# Patient Record
Sex: MALE | Race: WHITE | NOT HISPANIC OR LATINO | Employment: FULL TIME | URBAN - METROPOLITAN AREA
[De-identification: names, ages, dates, MRNs, and addresses within clinical notes are randomized per-mention and may not be internally consistent; named-entity substitution may affect disease eponyms.]

---

## 2017-11-14 ENCOUNTER — OFFICE VISIT (OUTPATIENT)
Dept: INTERNAL MEDICINE | Age: 52
End: 2017-11-14

## 2017-11-14 VITALS
HEIGHT: 72 IN | BODY MASS INDEX: 26.03 KG/M2 | SYSTOLIC BLOOD PRESSURE: 112 MMHG | WEIGHT: 192.2 LBS | DIASTOLIC BLOOD PRESSURE: 78 MMHG | TEMPERATURE: 97.8 F | OXYGEN SATURATION: 99 % | HEART RATE: 79 BPM

## 2017-11-14 DIAGNOSIS — M54.16 LUMBAR RADICULOPATHY, RIGHT: Primary | ICD-10-CM

## 2017-11-14 DIAGNOSIS — Z76.89 ENCOUNTER TO ESTABLISH CARE: ICD-10-CM

## 2017-11-14 PROBLEM — Z21 HIV INFECTION: Chronic | Status: ACTIVE | Noted: 2017-11-14

## 2017-11-14 PROBLEM — B20 HIV INFECTION: Chronic | Status: ACTIVE | Noted: 2017-11-14

## 2017-11-14 PROBLEM — E78.5 HYPERLIPIDEMIA: Chronic | Status: ACTIVE | Noted: 2017-11-14

## 2017-11-14 PROCEDURE — 99203 OFFICE O/P NEW LOW 30 MIN: CPT | Performed by: NURSE PRACTITIONER

## 2017-11-14 RX ORDER — ATORVASTATIN CALCIUM 40 MG/1
40 TABLET, FILM COATED ORAL EVERY OTHER DAY
COMMUNITY
Start: 2017-10-13 | End: 2021-03-11 | Stop reason: SDUPTHER

## 2017-11-14 RX ORDER — EFAVIRENZ, EMTRICITABINE, AND TENOFOVIR DISOPROXIL FUMARATE 600; 200; 300 MG/1; MG/1; MG/1
1 TABLET, FILM COATED ORAL DAILY
COMMUNITY
Start: 2017-10-13 | End: 2019-08-09

## 2017-11-14 NOTE — PROGRESS NOTES
"[facility]        Brendan Vickers / 52 y.o. / male  11/14/2017    VITALS:    /78  Pulse 79  Temp 97.8 °F (36.6 °C)  Ht 72\" (182.9 cm)  Wt 192 lb 3.2 oz (87.2 kg)  SpO2 99%  BMI 26.07 kg/m2    BP Readings from Last 3 Encounters:   11/14/17 112/78     Wt Readings from Last 3 Encounters:   11/14/17 192 lb 3.2 oz (87.2 kg)      Body mass index is 26.07 kg/(m^2).    CC: Main reason(s) for today's visit: Establish Care (new pt to establish care; pt reports back Sx 2006 to repair ruptured disc; pt states he recently has been bothered by back pain) and Back Pain      HPI:    Patient is a 52 y.o. male who is here to establish care. Previous PCP of Dr. Altman, has not been seen here in years.     Last visit with Dr. Wu (infectious disease, patient is HIV positive) was in September, she also manages labs and cholesterol medication. Patient unsure if he has had PSA level checked, but denies any previous CAIN.     Back Pain: Patient presents for presents evaluation of low back problems.  Symptoms have been present for 2 weeks and include numbness in right leg to foot and pain in lower back and right leg (aching and shooting in character; 2/10 in severity). Initial inciting event: none.  Alleviating factors identifiable by patient are none. Exacerbating factors identifiable by patient are bending forwards and sitting. Treatments so far initiated by patient: stretches, rest, swimming/water aerobics Previous lower back problems: patient had L5-S1 surgery in 2006 for ruptured disc. Previous workup: had had MRI in 2006 and 2008 reported by patient. Previous treatments: none.    History of back issues, back surgery on L5-S1 in 2006 for ruptured disc. Dr. Escobar previous for previous surgery.  Non-fasting blood sugar 112.  Patient reports today that his back pain has significantly improved in the past 24-48 hours, but still wants to establish care with a PCP. Otherwise, feels good, no weight changes, fatigue, " fever, etc. No recent infections.     Patient Care Team:  VERONIQUE Dow as PCP - General (Internal Medicine)  Amira Wu MD as Consulting Physician (Infectious Diseases)  Susan Trinidad MD as Consulting Physician (Dermatology)  ____________________________________________________________________    ASSESSMENT & PLAN:    1. Lumbar radiculopathy, right  Patient with previous history of L5-S1 surgery in 2006 for ruptured disc with recent exacerbation of LBP with radiculopathy.  He reports a significant improvement in symptoms in the past 1-2 days.  No systemic symptoms including fever, severe pain, weight loss.  Patient HIV status managed on Atripla by ID, patient reports recent normal CD4 counts.   At this time, patient reports that his pain has improved significantly, does not want to pursue any further treatment with medication or orthopedics referral.  No noted decrease in strength or sensation of RLE.  Discussed with patient that we can either pursue further treatment, such as orthopedist referral or physical therapy, or patient can continue to treat conservatively at home, which he chooses to do at this time.  I did instruct patient that if pain does not improve or gets worse, he needs to notify me and we will determine whether further imaging and/or treatment needs to be pursued at that time. He is agreeable to this plan.     I did also ask patient to sign record release to obtain his labs from September from his ID office.     We also discussed lack of concrete prostate cancer screening recommendations and risk/benefit of available screening. He has no family history of prostate Ca. At this time, he chooses to defer CAIN and PSA testing, will discuss adding PSA testing to lab work with Dr. Wu.     Return in about 1 year (around 11/14/2018) for Next scheduled follow up.    Future Appointments  Date Time Provider Department Center   11/13/2018 10:40 AM VERONIQUE Dow [facility]  None       ____________________________________________________________________        REVIEW OF SYSTEMS    Review of Systems   Constitutional: Negative for activity change, appetite change, chills, fatigue, fever and unexpected weight change.   Genitourinary: Negative for difficulty urinating, dysuria, flank pain, frequency and hematuria.         PHYSICAL EXAMINATION    Physical Exam   Constitutional: He is oriented to person, place, and time. Vital signs are normal. He appears well-developed and well-nourished. He is cooperative. He does not appear ill. No distress.   Cardiovascular: Normal rate, regular rhythm, S1 normal, S2 normal and normal heart sounds.    No murmur heard.  Pulmonary/Chest: Effort normal and breath sounds normal. He has no decreased breath sounds. He has no wheezes. He has no rhonchi. He has no rales.   Musculoskeletal:        Lumbar back: He exhibits normal range of motion, no tenderness, no bony tenderness and no pain.   Neurological: He is alert and oriented to person, place, and time. He has normal strength. No sensory deficit.   Skin: Skin is warm, dry and intact.   Psychiatric: He has a normal mood and affect. His speech is normal and behavior is normal. Judgment and thought content normal. Cognition and memory are normal.   Nursing note and vitals reviewed.      REVIEWED DATA:    Labs:   No results found for: NA, K, AST, ALT, BUN, CREATININE, EGFRIFNONA, EGFRIFAFRI    No results found for: GLU, HGBA1C, MICROALBUR    No results found for: LDL, HDL, TRIG, CHOLHDLRATIO    No results found for: TSH, FREET4     No results found for: WBC, HGB, PLT      Imaging:        Medical Tests:        Summary of old records / correspondence / consultant report:        Request outside records:        ______________________________________________________________________    ALLERGIES  No Known Allergies     MEDICATIONS  Current Outpatient Prescriptions   Medication Sig Dispense Refill   • atorvastatin  (LIPITOR) 40 MG tablet Take 40 mg by mouth Daily.     • ATRIPLA 600-200-300 MG per tablet Take 1 tablet by mouth Daily.     • Multiple Vitamins-Minerals (MULTIVITAMIN PO) Take 1 tablet by mouth Daily.       No current facility-administered medications for this visit.        PFSH:     The following portions of the patient's history were reviewed and updated as appropriate: Allergies / Current Medications / Past Medical History / Surgical History / Social History / Family History    PROBLEM LIST   Patient Active Problem List   Diagnosis   • Lumbar radiculopathy, right   • HIV infection   • Hyperlipidemia       PAST MEDICAL HISTORY  Past Medical History:   Diagnosis Date   • HIV disease    • Hyperlipidemia        SURGICAL HISTORY  Past Surgical History:   Procedure Laterality Date   • BACK SURGERY  2006       SOCIAL HISTORY  Social History     Social History   • Marital status: Single     Spouse name: N/A   • Number of children: 0   • Years of education: N/A     Occupational History   • Banker      Social History Main Topics   • Smoking status: Never Smoker   • Smokeless tobacco: Never Used   • Alcohol use No   • Drug use: No   • Sexual activity: Yes     Partners: Male     Other Topics Concern   • None     Social History Narrative   • None       FAMILY HISTORY  Family History   Problem Relation Age of Onset   • Diabetes Mother    • Heart attack Mother    • Heart disease Mother    • Colon cancer Father    • Diabetes Maternal Grandmother          **Tanvir Disclaimer:   Much of this encounter note is an electronic transcription/translation of spoken language to printed text. The electronic translation of spoken language may permit erroneous, or at times, nonsensical words or phrases to be inadvertently transcribed. Although I have reviewed the note for such errors, some may still exist.

## 2017-11-14 NOTE — PATIENT INSTRUCTIONS
Sciatica  Sciatica is pain, numbness, weakness, or tingling along the path of the sciatic nerve. The sciatic nerve starts in the lower back and runs down the back of each leg. The nerve controls the muscles in the lower leg and in the back of the knee. It also provides feeling (sensation) to the back of the thigh, the lower leg, and the sole of the foot. Sciatica is a symptom of another medical condition that pinches or puts pressure on the sciatic nerve.  Generally, sciatica only affects one side of the body. Sciatica usually goes away on its own or with treatment. In some cases, sciatica may keep coming back (recur).  CAUSES  This condition is caused by pressure on the sciatic nerve, or pinching of the sciatic nerve. This may be the result of:  · A disk in between the bones of the spine (vertebrae) bulging out too far (herniated disk).  · Age-related changes in the spinal disks (degenerative disk disease).  · A pain disorder that affects a muscle in the buttock (piriformis syndrome).  · Extra bone growth (bone spur) near the sciatic nerve.  · An injury or break (fracture) of the pelvis.  · Pregnancy.  · Tumor (rare).  RISK FACTORS  The following factors may make you more likely to develop this condition:  · Playing sports that place pressure or stress on the spine, such as football or weight lifting.  · Having poor strength and flexibility.  · A history of back injury.  · A history of back surgery.  · Sitting for long periods of time.  · Doing activities that involve repetitive bending or lifting.  · Obesity.  SYMPTOMS  Symptoms can vary from mild to very severe, and they may include:  · Any of these problems in the lower back, leg, hip, or buttock:    Mild tingling or dull aches.    Burning sensations.    Sharp pains.  · Numbness in the back of the calf or the sole of the foot.  · Leg weakness.  · Severe back pain that makes movement difficult.  These symptoms may get worse when you cough, sneeze, or laugh, or  when you sit or stand for long periods of time. Being overweight may also make symptoms worse. In some cases, symptoms may recur over time.  DIAGNOSIS  This condition may be diagnosed based on:  · Your symptoms.  · A physical exam. Your health care provider may ask you to do certain movements to check whether those movements trigger your symptoms.  · You may have tests, including:    Blood tests.    X-rays.    MRI.    CT scan.  TREATMENT  In many cases, this condition improves on its own, without any treatment. However, treatment may include:  · Reducing or modifying physical activity during periods of pain.  · Exercising and stretching to strengthen your abdomen and improve the flexibility of your spine.  · Icing and applying heat to the affected area.  · Medicines that help:    To relieve pain and swelling.    To relax your muscles.  · Injections of medicines that help to relieve pain, irritation, and inflammation around the sciatic nerve (steroids).  · Surgery.  HOME CARE INSTRUCTIONS  Medicines  · Take over-the-counter and prescription medicines only as told by your health care provider.  · Do not drive or operate heavy machinery while taking prescription pain medicine.  Managing Pain  · If directed, apply ice to the affected area.    Put ice in a plastic bag.    Place a towel between your skin and the bag.    Leave the ice on for 20 minutes, 2-3 times a day.  · After icing, apply heat to the affected area before you exercise or as often as told by your health care provider. Use the heat source that your health care provider recommends, such as a moist heat pack or a heating pad.    Place a towel between your skin and the heat source.    Leave the heat on for 20-30 minutes.    Remove the heat if your skin turns bright red. This is especially important if you are unable to feel pain, heat, or cold. You may have a greater risk of getting burned.  Activity  · Return to your normal activities as told by your health  care provider. Ask your health care provider what activities are safe for you.    Avoid activities that make your symptoms worse.  · Take brief periods of rest throughout the day. Resting in a lying or standing position is usually better than sitting to rest.    When you rest for longer periods, mix in some mild activity or stretching between periods of rest. This will help to prevent stiffness and pain.    Avoid sitting for long periods of time without moving. Get up and move around at least one time each hour.  · Exercise and stretch regularly, as told by your health care provider.  · Do not lift anything that is heavier than 10 lb (4.5 kg) while you have symptoms of sciatica. When you do not have symptoms, you should still avoid heavy lifting, especially repetitive heavy lifting.  · When you lift objects, always use proper lifting technique, which includes:    Bending your knees.    Keeping the load close to your body.    Avoiding twisting.  General Instructions   · Use good posture.    Avoid leaning forward while sitting.    Avoid hunching over while standing.  · Maintain a healthy weight. Excess weight puts extra stress on your back and makes it difficult to maintain good posture.  · Wear supportive, comfortable shoes. Avoid wearing high heels.  · Avoid sleeping on a mattress that is too soft or too hard. A mattress that is firm enough to support your back when you sleep may help to reduce your pain.  · Keep all follow-up visits as told by your health care provider. This is important.  SEEK MEDICAL CARE IF:  · You have pain that wakes you up when you are sleeping.  · You have pain that gets worse when you lie down.  · Your pain is worse than you have experienced in the past.  · Your pain lasts longer than 4 weeks.  · You experience unexplained weight loss.  SEEK IMMEDIATE MEDICAL CARE IF:  · You lose control of your bowel or bladder (incontinence).  · You have:    Weakness in your lower back, pelvis, buttocks,  or legs that gets worse.    Redness or swelling of your back.    A burning sensation when you urinate.     This information is not intended to replace advice given to you by your health care provider. Make sure you discuss any questions you have with your health care provider.     Document Released: 12/12/2002 Document Revised: 04/10/2017 Document Reviewed: 08/26/2016  [employer] Patient Education ©2017 [employer]

## 2018-06-04 ENCOUNTER — OUTSIDE FACILITY SERVICE (OUTPATIENT)
Dept: GASTROENTEROLOGY | Facility: CLINIC | Age: 53
End: 2018-06-04

## 2018-06-04 ENCOUNTER — LAB REQUISITION (OUTPATIENT)
Dept: LAB | Facility: HOSPITAL | Age: 53
End: 2018-06-04

## 2018-06-04 DIAGNOSIS — Z86.010 HISTORY OF COLONIC POLYPS: ICD-10-CM

## 2018-06-04 PROCEDURE — 88305 TISSUE EXAM BY PATHOLOGIST: CPT | Performed by: INTERNAL MEDICINE

## 2018-06-04 PROCEDURE — 45380 COLONOSCOPY AND BIOPSY: CPT | Performed by: INTERNAL MEDICINE

## 2018-06-06 LAB
CYTO UR: NORMAL
LAB AP CASE REPORT: NORMAL
LAB AP CLINICAL INFORMATION: NORMAL
Lab: NORMAL
PATH REPORT.FINAL DX SPEC: NORMAL
PATH REPORT.GROSS SPEC: NORMAL

## 2018-06-11 PROBLEM — D12.3 ADENOMATOUS POLYP OF TRANSVERSE COLON: Status: ACTIVE | Noted: 2018-06-11

## 2018-10-26 ENCOUNTER — TELEPHONE (OUTPATIENT)
Dept: INTERNAL MEDICINE | Age: 53
End: 2018-10-26

## 2018-10-26 DIAGNOSIS — E78.5 HYPERLIPIDEMIA, UNSPECIFIED HYPERLIPIDEMIA TYPE: ICD-10-CM

## 2018-10-26 DIAGNOSIS — Z00.00 ROUTINE HEALTH MAINTENANCE: Primary | ICD-10-CM

## 2018-10-26 NOTE — TELEPHONE ENCOUNTER
Lab orders are placed.       ----- Message from Lala Muñiz LPN sent at 10/26/2018  1:18 PM EDT -----  Regarding: Physical Labs  Pt is coming in on Monday for CPE labs. Is there a specific set you want ordered? I'm not sure what to put in.    KIMBERLYN Ochoa

## 2018-10-30 LAB
ALBUMIN SERPL-MCNC: 4.4 G/DL (ref 3.5–5.2)
ALBUMIN/GLOB SERPL: 1.5 G/DL
ALP SERPL-CCNC: 77 U/L (ref 39–117)
ALT SERPL-CCNC: 46 U/L (ref 1–41)
APPEARANCE UR: ABNORMAL
AST SERPL-CCNC: 38 U/L (ref 1–40)
BACTERIA #/AREA URNS HPF: ABNORMAL /HPF
BASOPHILS # BLD AUTO: 0.01 10*3/MM3 (ref 0–0.2)
BASOPHILS NFR BLD AUTO: 0.2 % (ref 0–1.5)
BILIRUB SERPL-MCNC: 0.4 MG/DL (ref 0.1–1.2)
BILIRUB UR QL STRIP: NEGATIVE
BUN SERPL-MCNC: 14 MG/DL (ref 6–20)
BUN/CREAT SERPL: 10.5 (ref 7–25)
CALCIUM SERPL-MCNC: 9.4 MG/DL (ref 8.6–10.5)
CHLORIDE SERPL-SCNC: 101 MMOL/L (ref 98–107)
CHOLEST SERPL-MCNC: 78 MG/DL (ref 0–200)
CHOLEST/HDLC SERPL: 2.6 {RATIO}
CO2 SERPL-SCNC: 26.1 MMOL/L (ref 22–29)
COLOR UR: YELLOW
CREAT SERPL-MCNC: 1.33 MG/DL (ref 0.76–1.27)
CRYSTALS URNS MICRO: ABNORMAL
EOSINOPHIL # BLD AUTO: 0.1 10*3/MM3 (ref 0–0.7)
EOSINOPHIL NFR BLD AUTO: 1.6 % (ref 0.3–6.2)
EPI CELLS #/AREA URNS HPF: ABNORMAL /HPF
ERYTHROCYTE [DISTWIDTH] IN BLOOD BY AUTOMATED COUNT: 13.7 % (ref 11.5–14.5)
GLOBULIN SER CALC-MCNC: 3 GM/DL
GLUCOSE SERPL-MCNC: 88 MG/DL (ref 65–99)
GLUCOSE UR QL: NEGATIVE
HCT VFR BLD AUTO: 48.1 % (ref 40.4–52.2)
HDLC SERPL-MCNC: 30 MG/DL (ref 40–60)
HGB BLD-MCNC: 16.5 G/DL (ref 13.7–17.6)
HGB UR QL STRIP: NEGATIVE
IMM GRANULOCYTES # BLD: 0.01 10*3/MM3 (ref 0–0.03)
IMM GRANULOCYTES NFR BLD: 0.2 % (ref 0–0.5)
KETONES UR QL STRIP: NEGATIVE
LDLC SERPL CALC-MCNC: 14 MG/DL (ref 0–100)
LEUKOCYTE ESTERASE UR QL STRIP: NEGATIVE
LYMPHOCYTES # BLD AUTO: 2.88 10*3/MM3 (ref 0.9–4.8)
LYMPHOCYTES NFR BLD AUTO: 45.9 % (ref 19.6–45.3)
MCH RBC QN AUTO: 32.1 PG (ref 27–32.7)
MCHC RBC AUTO-ENTMCNC: 34.3 G/DL (ref 32.6–36.4)
MCV RBC AUTO: 93.6 FL (ref 79.8–96.2)
MICRO URNS: ABNORMAL
MICRO URNS: ABNORMAL
MONOCYTES # BLD AUTO: 0.53 10*3/MM3 (ref 0.2–1.2)
MONOCYTES NFR BLD AUTO: 8.5 % (ref 5–12)
MUCOUS THREADS URNS QL MICRO: PRESENT /HPF
NEUTROPHILS # BLD AUTO: 2.75 10*3/MM3 (ref 1.9–8.1)
NEUTROPHILS NFR BLD AUTO: 43.8 % (ref 42.7–76)
NITRITE UR QL STRIP: NEGATIVE
PH UR STRIP: 7 [PH] (ref 5–7.5)
PLATELET # BLD AUTO: 212 10*3/MM3 (ref 140–500)
POTASSIUM SERPL-SCNC: 4.2 MMOL/L (ref 3.5–5.2)
PROT SERPL-MCNC: 7.4 G/DL (ref 6–8.5)
PROT UR QL STRIP: NEGATIVE
RBC # BLD AUTO: 5.14 10*6/MM3 (ref 4.6–6)
RBC #/AREA URNS HPF: ABNORMAL /HPF
SODIUM SERPL-SCNC: 140 MMOL/L (ref 136–145)
SP GR UR: 1.02 (ref 1–1.03)
TRIGL SERPL-MCNC: 168 MG/DL (ref 0–150)
TSH SERPL DL<=0.005 MIU/L-ACNC: 2.99 MIU/ML (ref 0.27–4.2)
UNIDENT CRYS URNS QL MICRO: PRESENT /LPF
URINALYSIS REFLEX: ABNORMAL
UROBILINOGEN UR STRIP-MCNC: 0.2 MG/DL (ref 0.2–1)
VLDLC SERPL CALC-MCNC: 33.6 MG/DL (ref 5–40)
WBC # BLD AUTO: 6.27 10*3/MM3 (ref 4.5–10.7)
WBC #/AREA URNS HPF: ABNORMAL /HPF

## 2018-11-05 ENCOUNTER — OFFICE VISIT (OUTPATIENT)
Dept: INTERNAL MEDICINE | Age: 53
End: 2018-11-05

## 2018-11-05 ENCOUNTER — HOSPITAL ENCOUNTER (OUTPATIENT)
Dept: GENERAL RADIOLOGY | Facility: HOSPITAL | Age: 53
Discharge: HOME | End: 2018-11-05
Admitting: NURSE PRACTITIONER

## 2018-11-05 VITALS
WEIGHT: 188.8 LBS | HEART RATE: 91 BPM | HEIGHT: 72 IN | DIASTOLIC BLOOD PRESSURE: 88 MMHG | BODY MASS INDEX: 25.57 KG/M2 | OXYGEN SATURATION: 96 % | TEMPERATURE: 98.3 F | SYSTOLIC BLOOD PRESSURE: 126 MMHG

## 2018-11-05 DIAGNOSIS — Z00.00 ANNUAL PHYSICAL EXAM: Primary | ICD-10-CM

## 2018-11-05 DIAGNOSIS — M54.16 LUMBAR RADICULOPATHY, RIGHT: ICD-10-CM

## 2018-11-05 DIAGNOSIS — Z12.5 ENCOUNTER FOR PROSTATE CANCER SCREENING: ICD-10-CM

## 2018-11-05 DIAGNOSIS — D84.9 IMMUNOCOMPROMISED (HCC): ICD-10-CM

## 2018-11-05 DIAGNOSIS — Z23 ENCOUNTER FOR IMMUNIZATION: ICD-10-CM

## 2018-11-05 LAB — PSA SERPL-MCNC: 1.5 NG/ML (ref 0–4)

## 2018-11-05 PROCEDURE — 99396 PREV VISIT EST AGE 40-64: CPT | Performed by: NURSE PRACTITIONER

## 2018-11-05 PROCEDURE — 90471 IMMUNIZATION ADMIN: CPT | Performed by: NURSE PRACTITIONER

## 2018-11-05 PROCEDURE — 90715 TDAP VACCINE 7 YRS/> IM: CPT | Performed by: NURSE PRACTITIONER

## 2018-11-05 PROCEDURE — 72110 X-RAY EXAM L-2 SPINE 4/>VWS: CPT

## 2018-11-05 PROCEDURE — 99214 OFFICE O/P EST MOD 30 MIN: CPT | Performed by: NURSE PRACTITIONER

## 2018-11-05 RX ORDER — FLUTICASONE PROPIONATE 50 MCG
SPRAY, SUSPENSION (ML) NASAL
Refills: 5 | COMMUNITY
Start: 2018-10-05 | End: 2019-11-04

## 2018-11-05 NOTE — PROGRESS NOTES
[facility]      Brendan Vickers / 53 y.o. / male  11/05/2018    CC: Annual Exam (CPE )      HPI:      Brendan presents for annual health maintenance visit. Appointment with Dr. Wu.     Back Pain: Patient presents for presents evaluation of low back problems.  Symptoms have been present for 2 years and include paresthesias in right foot. . Initial inciting event: none. . Exacerbating factors identifiable by patient are sitting. Treatments so far initiated by patient: none Previous lower back problems: Patient has history of discectomy 2006, reports significant flare up in 2010. Recent back pain has been going on for about 2 years now, occurs about once weekly, no worsening of symptoms since restarting 2 years ago. . Previous workup: last MRI 2010 . Denies any fever, chills, weight loss. No weakness of leg or bowel or bladder incontinence.       · Last health maintenance visit: 1 year ago  · General health: good  · Lifestyle:  · Attempting to lose weight?: No   · Diet: good  · Exercise: good, water aerobics and swimming laps   · Tobacco: Never used   · Alcohol:   · Work: Full-time    · Reproductive health:  · Sexually active?: Yes   · Concern for STD?: No   · Sexual problems?: No problems   · Sees Urologist?: No   · Depression Screening:      PHQ-2/PHQ-9 Depression Screening 11/5/2018   Little interest or pleasure in doing things 0   Feeling down, depressed, or hopeless 0   Total Score 0         PHQ-2: 0 (Not depressed)     PHQ-9: 0 (Negative screening for depression)    Patient Care Team:  Mary Ann Chapman APRN as PCP - General (Internal Medicine)  Amira Wu MD as Consulting Physician (Infectious Diseases)  Susan Trinidad MD as Consulting Physician (Dermatology)  MonikChristophe diane MD as Consulting Physician (Gastroenterology)  ______________________________________________________________________    ALLERGIES  No Known Allergies     MEDICATIONS  Current Outpatient Prescriptions    Medication Sig Dispense Refill   • atorvastatin (LIPITOR) 40 MG tablet Take 40 mg by mouth Daily.     • ATRIPLA 600-200-300 MG per tablet Take 1 tablet by mouth Daily.     • fluticasone (FLONASE) 50 MCG/ACT nasal spray SPRAY 2 SPRAYS INTO EACH NOSTRIL EVERY DAY  5   • Multiple Vitamins-Minerals (MULTIVITAMIN PO) Take 1 tablet by mouth Daily.       No current facility-administered medications for this visit.        PFSH:     The following portions of the patient's history were reviewed and updated as appropriate: Allergies / Current Medications / Past Medical History / Surgical History / Social History / Family History    PROBLEM LIST   Patient Active Problem List   Diagnosis   • Lumbar radiculopathy, right   • HIV infection (CMS/HCC)   • Hyperlipidemia   • Adenomatous polyp of transverse colon       PAST MEDICAL HISTORY  Past Medical History:   Diagnosis Date   • Colon polyp    • HIV disease (CMS/HCC)    • Hyperlipidemia        SURGICAL HISTORY  Past Surgical History:   Procedure Laterality Date   • BACK SURGERY  2006       SOCIAL HISTORY  Social History     Social History   • Marital status: Single   • Number of children: 0     Occupational History   • Banker      Social History Main Topics   • Smoking status: Never Smoker   • Smokeless tobacco: Never Used   • Alcohol use No   • Drug use: No   • Sexual activity: Yes     Partners: Male     Other Topics Concern   • Not on file       FAMILY HISTORY  Family History   Problem Relation Age of Onset   • Diabetes Mother    • Heart attack Mother    • Heart disease Mother    • Colon cancer Father    • Diabetes Maternal Grandmother        IMMUNIZATION HISTORY  Immunization History   Administered Date(s) Administered   • Flu Mist 10/11/2017   • Flu Vaccine Quad PF >36MO 10/08/2018   • Hepatitis A 04/20/2018, 10/20/2018   • Pneumococcal Conjugate 13-Valent (PCV13) 11/14/2015   • Pneumococcal Polysaccharide (PPSV23) 11/01/2016   • Tdap 11/05/2018  "      ______________________________________________________________________    REVIEW OF SYSTEMS    Review of Systems   Constitutional: Negative for activity change, appetite change, fatigue, fever and unexpected weight change.   HENT: Negative for congestion, ear pain, hearing loss, sore throat and trouble swallowing.    Eyes: Negative for pain and visual disturbance.   Respiratory: Negative for cough and shortness of breath.    Cardiovascular: Negative for chest pain and leg swelling.   Gastrointestinal: Negative for abdominal pain, blood in stool, constipation, diarrhea, nausea and vomiting.   Endocrine: Negative for polydipsia, polyphagia and polyuria.   Genitourinary: Negative for decreased urine volume, difficulty urinating, discharge, dysuria, enuresis, frequency, hematuria, penile pain, penile swelling, scrotal swelling, testicular pain and urgency.   Musculoskeletal: Positive for back pain. Negative for gait problem.   Neurological: Negative for dizziness, syncope, speech difficulty, weakness, numbness and headaches.   Psychiatric/Behavioral: Negative for confusion and sleep disturbance. The patient is not nervous/anxious.          VITALS:    Visit Vitals  /88   Pulse 91   Temp 98.3 °F (36.8 °C)   Ht 182.9 cm (72.01\")   Wt 85.6 kg (188 lb 12.8 oz)   SpO2 96%   BMI 25.60 kg/m²       BP Readings from Last 3 Encounters:   11/05/18 126/88   11/14/17 112/78     Wt Readings from Last 3 Encounters:   11/05/18 85.6 kg (188 lb 12.8 oz)   11/14/17 87.2 kg (192 lb 3.2 oz)      Body mass index is 25.6 kg/m².    PHYSICAL EXAMINATION    Physical Exam   Constitutional: Vital signs are normal. He appears well-developed and well-nourished. He is cooperative. He does not appear ill. No distress.   HENT:   Head: Normocephalic and atraumatic.   Right Ear: Hearing, tympanic membrane, external ear and ear canal normal.   Left Ear: Hearing, tympanic membrane, external ear and ear canal normal.   Nose: Nose normal. "   Mouth/Throat: Uvula is midline and oropharynx is clear and moist.   Eyes: Pupils are equal, round, and reactive to light. EOM and lids are normal.   Neck: Trachea normal and full passive range of motion without pain. Carotid bruit is not present. No thyroid mass and no thyromegaly present.   Cardiovascular: Normal rate, regular rhythm, S1 normal, S2 normal and normal heart sounds.    No murmur heard.  Pulmonary/Chest: Effort normal and breath sounds normal.   Abdominal: Soft. Normal appearance and bowel sounds are normal. There is no tenderness.   Genitourinary:   Genitourinary Comments: Deferred    Lymphadenopathy:     He has no cervical adenopathy.     He has no axillary adenopathy.   Neurological: He is alert. He has normal strength. He is not disoriented. No cranial nerve deficit or sensory deficit.   Reflex Scores:       Bicep reflexes are 2+ on the right side and 2+ on the left side.       Brachioradialis reflexes are 2+ on the right side and 2+ on the left side.       Patellar reflexes are 1+ on the right side and 1+ on the left side.       Achilles reflexes are 1+ on the right side and 1+ on the left side.  Skin: Skin is warm, dry and intact.   Nursing note and vitals reviewed.        REVIEWED DATA    Labs:    Lab Results   Component Value Date     10/29/2018    K 4.2 10/29/2018    AST 38 10/29/2018    ALT 46 (H) 10/29/2018    BUN 14 10/29/2018    CREATININE 1.33 (H) 10/29/2018    EGFRIFNONA 56 (L) 10/29/2018    EGFRIFAFRI 68 10/29/2018       Lab Results   Component Value Date    TSH 2.99 10/29/2018       No results found for: PSA, TESTOSTEROTT    Lab Results   Component Value Date    LDL 14 10/29/2018    HDL 30 (L) 10/29/2018    TRIG 168 (H) 10/29/2018    CHOLHDLRATIO 2.60 10/29/2018       No components found for: [id number]    Lab Results   Component Value Date    HGB 16.5 10/29/2018    MCV 93.6 10/29/2018     10/29/2018       Lab Results   Component Value Date    PROTEIN Negative 10/29/2018     GLUCOSEU Negative 10/29/2018    BLOODU Negative 10/29/2018    NITRITEU Negative 10/29/2018    LEUKOCYTESUR Negative 10/29/2018        No results found for: HEPCVIRUSABY    Imaging:         Medical Tests:       ______________________________________________________________________    ASSESSMENT & PLAN    ANNUAL WELLNESS EXAM / PHYSICAL     Other medical problems addressed today:    Lumbar radiculopathy       Summary/Discussion:       1. Annual physical exam      2. Encounter for immunization    - Tdap Vaccine Greater Than or Equal To 8yo IM    3. Lumbar radiculopathy, right  Patient with c/o chronic right back pain with radiculopathy, s/p surgery 2006, with increased symptoms (not worsening) over the past 2 years. Considering patient's HIV history, will obtain XR today to r/o acute bone abnormality. Discussed with patient may consider referral to spine specialist/ortho pending results of XR if he so chooses.     - XR Spine Lumbar 4+ View    4. Immunocompromised (CMS/HCC)    - XR Spine Lumbar 4+ View    5. Encounter for prostate cancer screening  Discussed risk vs. Benefit of CAIN, no standard guidelines for performing annual CAIN. Patient defers at this time, will check PSA level today.     - PSA SCREENING        Return in about 1 year (around 11/5/2019) for Annual physical, Next scheduled follow up.    Future Appointments  Date Time Provider Department Center   10/30/2019 8:30 AM [facility] [facility] None   11/7/2019 8:00 AM Mary Ann Chapman APRN [facility] None         HEALTHCARE MAINTENANCE ISSUES:    Cancer Screening:  · Colon: Initial/Next screening at age: CURRENT  · Repeat colonoscopy every 5 years  · Prostate: PSA checked annually but no CAIN needed currently  · Testicular: Recommended monthly self exam  · Skin: Monthly self skin examination, annual exam by health professional  · Lung:   · Other:    Screening Labs & Tests:  · Lab results reviewed & discussed with with patient or orders placed  today.  · EKG:  · Vascular Screening:   · DEXA (75+ or risk factors):   · HEP C (If born [id number] or risk factors): Previously had negative screen    Immunization/Vaccinations (to be given today unless deferred by patient)  · Influenza: Patient had the flu shot this season  · Hepatitis A: Up to date  · Tetanus/Pertussis: Administer today  · Pneumovax: Up to date  · Prevnar 13: Up to date  · Shingles: Recommended Shingrix at pharmacy  · Other:     Lifestyle Counseling:  · Lifestyle Modifications:   · Safety Issues: Always wear seatbelt, Avoid texting while driving   · Use sunscreen, regular skin examination  · Recommended annual dental/vision examination.  · Emotional/Stress/Sleep: Reviewed and  given when appropriate      Health Maintenance   Topic Date Due   • TDAP/TD VACCINES (1 - Tdap) 08/30/1982   • ZOSTER VACCINE (1 of 2) 08/30/2013   • LIPID PANEL  10/29/2019   • ANNUAL PHYSICAL  11/06/2019   • PNEUMOCOCCAL VACCINE (19-64 HIGHEST RISK) (3 of 3 - PPSV23) 11/01/2021   • COLONOSCOPY  06/04/2023   • PNEUMOCOCCAL VACCINE (19-64 HIGH RISK)  Completed   • HEPATITIS C SCREENING  Completed   • INFLUENZA VACCINE  Completed         **Dragon Disclaimer:   Much of this encounter note is an electronic transcription/translation of spoken language to printed text. The electronic translation of spoken language may permit erroneous, or at times, nonsensical words or phrases to be inadvertently transcribed. Although I have reviewed the note for such errors, some may still exist.

## 2018-11-07 DIAGNOSIS — M54.16 LUMBAR RADICULOPATHY, RIGHT: Primary | ICD-10-CM

## 2018-11-23 ENCOUNTER — HOSPITAL ENCOUNTER (OUTPATIENT)
Dept: MRI IMAGING | Facility: HOSPITAL | Age: 53
Discharge: HOME | End: 2018-11-23
Admitting: NURSE PRACTITIONER

## 2018-11-23 PROCEDURE — 72148 MRI LUMBAR SPINE W/O DYE: CPT

## 2018-11-30 ENCOUNTER — TELEPHONE (OUTPATIENT)
Dept: INTERNAL MEDICINE | Age: 53
End: 2018-11-30

## 2018-11-30 DIAGNOSIS — Z98.890 HISTORY OF LUMBAR LAMINECTOMY: ICD-10-CM

## 2018-11-30 DIAGNOSIS — M51.36 DDD (DEGENERATIVE DISC DISEASE), LUMBAR: ICD-10-CM

## 2018-11-30 DIAGNOSIS — M48.04 THORACIC STENOSIS: Primary | ICD-10-CM

## 2018-11-30 NOTE — TELEPHONE ENCOUNTER
S/w pt and discussed options of either PT, or Ortho referral.     Pt would like to take the Ortho route at this time.     When pt asked about who we usually send referrals to, I mentioned [facility], and pt stated he has been seen by them before, when the pain originally started. Pt would like to return there if at all possible, and preferably sooner rather than later.     Please advise re: referral to Ortho.    KD

## 2018-11-30 NOTE — TELEPHONE ENCOUNTER
Patient called and would like to talk with you about his MRI spine. He would like to talk with you about this, as he has some questions.

## 2019-01-08 ENCOUNTER — OFFICE VISIT (OUTPATIENT)
Dept: ORTHOPEDIC SURGERY | Facility: CLINIC | Age: 54
End: 2019-01-08

## 2019-01-08 VITALS — TEMPERATURE: 98.1 F | BODY MASS INDEX: 25.06 KG/M2 | HEIGHT: 72 IN | WEIGHT: 185 LBS

## 2019-01-08 DIAGNOSIS — M54.31 SCIATICA OF RIGHT SIDE: Primary | ICD-10-CM

## 2019-01-08 PROCEDURE — 99203 OFFICE O/P NEW LOW 30 MIN: CPT | Performed by: ORTHOPAEDIC SURGERY

## 2019-01-08 NOTE — PROGRESS NOTES
New patient or new problem visit    Chief Complaint   Patient presents with   • Thoracic Spine - Pain       HPI: He has a long-standing history of thoracic back pain which radiates the right lower extremity no real lumbar pain in the thoracic pain is not bad is mostly what he calls sciatic.  Had a similar episode no 6 after which she he underwent lumbar discectomy by Dr. Celestin with excellent relief.  He does note occasional tingling in the right foot.  No balance difficulties bowel or bladder complaints.  He does aqua therapy and some swimming.    PFSH: See chart- reviewed    Review of Systems   Constitutional: Negative for chills, fever and unexpected weight change.   HENT: Negative for trouble swallowing and voice change.    Eyes: Negative for visual disturbance.   Respiratory: Negative for cough and shortness of breath.    Cardiovascular: Negative for chest pain and leg swelling.   Gastrointestinal: Negative for abdominal pain, nausea and vomiting.   Endocrine: Negative for cold intolerance and heat intolerance.   Genitourinary: Negative for difficulty urinating, frequency and urgency.   Skin: Negative for rash and wound.   Allergic/Immunologic: Negative for immunocompromised state.   Neurological: Negative for weakness and numbness.   Hematological: Does not bruise/bleed easily.   Psychiatric/Behavioral: Negative for dysphoric mood. The patient is not nervous/anxious.        PE: Constitutional: Vital signs above-noted.  Awake, alert and oriented    Psychiatric: Affect and insight do not appear grossly disturbed.    Pulmonary: Breathing is unlabored, color is good.    Skin: Warm, dry and normal turgor    Cardiac:  pedal pulses intact.  No edema.    Eyesight and hearing appear adequate for examination purposes      Musculoskeletal:  There is no tenderness to percussion and palpation of the spine. Motion appears undisturbed.  Posture is unremarkable to coronal and sagittal inspection.    The skin about the area  is intact.  There is no palpable or visible deformity.  There is no local spasm.       Neurologic:   Reflexes are 2+ and symmetrical in the patellae and left Achilles but absent on the right.   Motor function is undisturbed in quadriceps, EHL, and gastrocnemius      Sensation appears symmetrically intact to light touch   .  In the bilateral lower extremities there is no evidence of atrophy.   Clonus is absent..  Gait appears undisturbed.  SLR test is equivocal      MEDICAL DECISION MAKING    XRAY: Plain film x-rays of the lumbar spine show lumbosacral disc space degeneration and loss of lordosis no comparison views are available.  MRI scan of the lumbar spine demonstrates minimal disc bulging and some conjoint nerves at L5 S1 may be a hint of the foraminal stenosis on the right but nothing terrible.  Remainder the disc spaces appear fairly well-preserved there is a small disc bulge at the edge of the image at T10 11 which is not detail.  I reviewed the radiologist report.    Other: n/a    Impression: Back pain and the lumbar radiculopathy    Plan: Now physical therapy and when necessary follow-up.  If he fails to improve epidurals could be helpful as an empiric determination of etiology, as well as a source of pain relief.

## 2019-08-08 NOTE — PROGRESS NOTES
"Brendan Vickers / 53 y.o. / male  Encounter Date: 08/09/2019    ASSESSMENT & PLAN:    Problem List Items Addressed This Visit     None      Visit Diagnoses     Localized swelling, mass or lump of neck    -  Primary    Relevant Orders    US Head Neck Soft Tissue    Common wart        Relevant Medications    BIKTARVY -25 MG per tablet    Other Relevant Orders    Cryotherapy, Skin Lesion        Orders Placed This Encounter   Procedures   • Cryotherapy, Skin Lesion   • US Head Neck Soft Tissue     No orders of the defined types were placed in this encounter.      Summary/Discussion:  1.  Ultrasound ordered for nonspecific localized lump of posterior left neck x3 weeks. Monitor for signs of fever, chills, impaired breathing/swallowing, pain, decreased neck or shoulder ROM.   2.  Common wart on posterior left foot, cryotherapy applied today.  Advised patient to keep the area covered and clean, skin will peel, and to follow-up in 2 weeks for repeat cryotherapy.    Cryotherapy, Skin Lesion  Date/Time: 8/9/2019 1:50 PM  Performed by: Linnette Frey APRN  Authorized by: Linnette Frey APRN   Consent: Verbal consent obtained.  Risks and benefits: risks, benefits and alternatives were discussed  Consent given by: patient  Patient understanding: patient states understanding of the procedure being performed  Patient identity confirmed: verbally with patient  Patient tolerance: Patient tolerated the procedure well with no immediate complications          Return in about 2 weeks (around 8/23/2019), or if symptoms worsen or fail to improve, for wart cryotherapy.  ________________________________________________________________    VITALS:    Visit Vitals  /80   Pulse 82   Temp 97.1 °F (36.2 °C) (Temporal)   Ht 182.9 cm (72.01\")   Wt 87.5 kg (193 lb)   SpO2 97%   BMI 26.17 kg/m²       BP Readings from Last 3 Encounters:   08/09/19 122/80   11/05/18 126/88   11/14/17 112/78     Wt Readings from Last 3 Encounters:   08/09/19 87.5 " kg (193 lb)   01/08/19 83.9 kg (185 lb)   11/23/18 85.3 kg (188 lb)      Body mass index is 26.17 kg/m².    CC: Main reason(s) for today's visit: Mass (back of lower left side of neck.)      HPI    Patient is a 53 y.o. male who is here for complaint of a lump on the back of his neck approximately 3 weeks.  He is a new patient to me. These are new problems to me. I am providing cross coverage for her PCP, Mary Ann Chapman while she is on leave.   Lump is non-tender, non-erythematous, immobile, semi-soft. There are no lesions or scabs or openings on the lump area. It is located at the posterior L base of the neck. Approximate size [id number]. He does have HIV infection, and is managed by outside provider, reports his cell counts are well within normal range. He denies pain, impaired ROM, headaches, swallowing/breathing impairment, numbness/tingling, weight loss, night sweats, SOA, vision changes.     Of note, he also has a lump area from childhood on the L medial quad, that has not changed since he was a child. Workup for this was negative.     Common wart located posterior L heel. Has tried OTC treatments x 2 without successful resolution.     Patient Care Team:  Mary Ann Chapman APRN as PCP - General (Internal Medicine)  Amira Wu MD as Consulting Physician (Infectious Diseases)  Susan Trinidad MD as Consulting Physician (Dermatology)  [facility], Christophe Clifton MD as Consulting Physician (Gastroenterology)  ____________________________________________________________________    REVIEW OF SYSTEMS    Review of Systems   Constitutional: Negative.  Negative for activity change, appetite change, chills, diaphoresis, fatigue, fever and unexpected weight change.   HENT: Negative for sore throat and trouble swallowing.    Eyes: Negative for photophobia and visual disturbance.   Respiratory: Negative.  Negative for cough and shortness of breath.    Cardiovascular: Negative.    Musculoskeletal: Negative.   Negative for arthralgias, myalgias, neck pain and neck stiffness.   Skin:        Wart on L heel, superficial lump on posterior L neck.   Neurological: Negative.  Negative for dizziness, weakness, light-headedness and headaches.   Hematological: Negative for adenopathy.   Psychiatric/Behavioral: Negative.        PHYSICAL EXAMINATION    Physical Exam   Constitutional: He is oriented to person, place, and time. He appears well-developed and well-nourished. No distress.   Neck: Normal range of motion. Neck supple.   Pulmonary/Chest: Effort normal.   Musculoskeletal: Normal range of motion. He exhibits no edema or tenderness.   Lymphadenopathy:     He has no cervical adenopathy.   Neurological: He is alert and oriented to person, place, and time.   Skin: Skin is warm and dry.   See HPI: superficial lump on L posterior neck, Common wart on L heel   Psychiatric: He has a normal mood and affect. His behavior is normal. Judgment and thought content normal.   Vitals reviewed.    REVIEWED DATA:    Labs:   Lab Results   Component Value Date     10/29/2018    K 4.2 10/29/2018    AST 38 10/29/2018    ALT 46 (H) 10/29/2018    BUN 14 10/29/2018    CREATININE 1.33 (H) 10/29/2018    EGFRIFNONA 56 (L) 10/29/2018    EGFRIFAFRI 68 10/29/2018       No results found for: GLUCOSE, HGBA1C, MICROALBUR    Lab Results   Component Value Date    LDL 14 10/29/2018    HDL 30 (L) 10/29/2018    TRIG 168 (H) 10/29/2018    CHOLHDLRATIO 2.60 10/29/2018       Lab Results   Component Value Date    TSH 2.99 10/29/2018          Lab Results   Component Value Date    WBC 6.27 10/29/2018    HGB 16.5 10/29/2018     10/29/2018         Imaging:      Medical Tests:      Summary of old records / correspondence / consultant report:      Request outside records:   ______________________________________________________________________    ALLERGIES  No Known Allergies     MEDICATIONS  Current Outpatient Medications on File Prior to Visit   Medication Sig    • atorvastatin (LIPITOR) 40 MG tablet Take 40 mg by mouth Daily.   • fluticasone (FLONASE) 50 MCG/ACT nasal spray SPRAY 2 SPRAYS INTO EACH NOSTRIL EVERY DAY   • Multiple Vitamins-Minerals (MULTIVITAMIN PO) Take 1 tablet by mouth Daily.   • Zoster Vac Recomb Adjuvanted (SHINGRIX) 50 MCG/0.5ML reconstituted suspension ADMINISTER AS DIRECTED   • BIKTARVY -25 MG per tablet    • [DISCONTINUED] ATRIPLA 600-200-300 MG per tablet Take 1 tablet by mouth Daily.     No current facility-administered medications on file prior to visit.        PFSH:     The following portions of the patient's history were reviewed and updated as appropriate: Allergies / Current Medications / Past Medical History / Surgical History / Social History / Family History    PROBLEM LIST   Patient Active Problem List   Diagnosis   • Lumbar radiculopathy, right   • HIV infection (CMS/HCC)   • Hyperlipidemia   • Adenomatous polyp of transverse colon       PAST MEDICAL HISTORY  Past Medical History:   Diagnosis Date   • Colon polyp    • HIV disease (CMS/HCC)    • Hyperlipidemia        SURGICAL HISTORY  Past Surgical History:   Procedure Laterality Date   • BACK SURGERY  2006       SOCIAL HISTORY  Social History     Socioeconomic History   • Marital status: Single     Spouse name: Not on file   • Number of children: 0   • Years of education: Not on file   • Highest education level: Not on file   Occupational History   • Occupation: Banker   Tobacco Use   • Smoking status: Never Smoker   • Smokeless tobacco: Never Used   Substance and Sexual Activity   • Alcohol use: No   • Drug use: No   • Sexual activity: Yes     Partners: Male       FAMILY HISTORY  Family History   Problem Relation Age of Onset   • Diabetes Mother    • Heart attack Mother    • Heart disease Mother    • Colon cancer Father    • Diabetes Maternal Grandmother

## 2019-08-09 ENCOUNTER — OFFICE VISIT (OUTPATIENT)
Dept: INTERNAL MEDICINE | Age: 54
End: 2019-08-09

## 2019-08-09 VITALS
TEMPERATURE: 97.1 F | HEART RATE: 82 BPM | SYSTOLIC BLOOD PRESSURE: 122 MMHG | OXYGEN SATURATION: 97 % | DIASTOLIC BLOOD PRESSURE: 80 MMHG | WEIGHT: 193 LBS | HEIGHT: 72 IN | BODY MASS INDEX: 26.14 KG/M2

## 2019-08-09 DIAGNOSIS — B07.8 COMMON WART: ICD-10-CM

## 2019-08-09 DIAGNOSIS — R22.1 LOCALIZED SWELLING, MASS OR LUMP OF NECK: Primary | ICD-10-CM

## 2019-08-09 PROCEDURE — 99214 OFFICE O/P EST MOD 30 MIN: CPT | Performed by: NURSE PRACTITIONER

## 2019-08-09 PROCEDURE — 17110 DESTRUCTION B9 LES UP TO 14: CPT | Performed by: NURSE PRACTITIONER

## 2019-08-09 RX ORDER — BICTEGRAVIR SODIUM, EMTRICITABINE, AND TENOFOVIR ALAFENAMIDE FUMARATE 50; 200; 25 MG/1; MG/1; MG/1
1 TABLET ORAL NIGHTLY
COMMUNITY
Start: 2019-07-29 | End: 2022-01-14 | Stop reason: SDUPTHER

## 2019-08-15 ENCOUNTER — HOSPITAL ENCOUNTER (OUTPATIENT)
Dept: ULTRASOUND IMAGING | Facility: HOSPITAL | Age: 54
Discharge: HOME | End: 2019-08-15
Admitting: NURSE PRACTITIONER

## 2019-08-15 DIAGNOSIS — R22.1 LOCALIZED SWELLING, MASS OR LUMP OF NECK: ICD-10-CM

## 2019-08-15 PROCEDURE — 76536 US EXAM OF HEAD AND NECK: CPT

## 2019-08-22 NOTE — PROGRESS NOTES
Cryotherapy, Skin Lesion  Date/Time: 8/22/2019 6:30 PM  Performed by: Linnette Frey APRN  Authorized by: Linnette Frey APRN   Consent: Verbal consent obtained.  Risks and benefits: risks, benefits and alternatives were discussed  Consent given by: patient  Patient understanding: patient states understanding of the procedure being performed  Patient tolerance: Patient tolerated the procedure well with no immediate complications

## 2019-08-23 ENCOUNTER — OFFICE VISIT (OUTPATIENT)
Dept: INTERNAL MEDICINE | Age: 54
End: 2019-08-23

## 2019-08-23 VITALS
TEMPERATURE: 97.3 F | HEART RATE: 67 BPM | OXYGEN SATURATION: 97 % | WEIGHT: 188 LBS | BODY MASS INDEX: 25.47 KG/M2 | DIASTOLIC BLOOD PRESSURE: 74 MMHG | SYSTOLIC BLOOD PRESSURE: 118 MMHG | HEIGHT: 72 IN

## 2019-08-23 DIAGNOSIS — B07.8 COMMON WART: Primary | ICD-10-CM

## 2019-08-23 PROCEDURE — 17110 DESTRUCTION B9 LES UP TO 14: CPT | Performed by: NURSE PRACTITIONER

## 2019-09-05 NOTE — PROGRESS NOTES
Cryotherapy, Skin Lesion  Date/Time: 9/5/2019 8:56 AM  Performed by: Linnette Frey APRN  Authorized by: Linnette Frey APRN   Consent: Verbal consent obtained.  Risks and benefits: risks, benefits and alternatives were discussed  Consent given by: patient  Patient understanding: patient states understanding of the procedure being performed  Patient identity confirmed: verbally with patient  Patient tolerance: Patient tolerated the procedure well with no immediate complications

## 2019-09-06 ENCOUNTER — OFFICE VISIT (OUTPATIENT)
Dept: INTERNAL MEDICINE | Age: 54
End: 2019-09-06

## 2019-09-06 VITALS
HEART RATE: 83 BPM | WEIGHT: 192 LBS | TEMPERATURE: 96.8 F | HEIGHT: 72 IN | SYSTOLIC BLOOD PRESSURE: 110 MMHG | BODY MASS INDEX: 26.01 KG/M2 | DIASTOLIC BLOOD PRESSURE: 80 MMHG | OXYGEN SATURATION: 98 %

## 2019-09-06 DIAGNOSIS — B07.8 COMMON WART: Primary | ICD-10-CM

## 2019-09-06 DIAGNOSIS — R22.1 LOCALIZED SWELLING, MASS OR LUMP OF NECK: ICD-10-CM

## 2019-09-06 PROCEDURE — 99213 OFFICE O/P EST LOW 20 MIN: CPT | Performed by: NURSE PRACTITIONER

## 2019-09-06 PROCEDURE — 17110 DESTRUCTION B9 LES UP TO 14: CPT | Performed by: NURSE PRACTITIONER

## 2019-09-06 RX ORDER — SILDENAFIL 100 MG/1
TABLET, FILM COATED ORAL
Refills: 1 | COMMUNITY
Start: 2019-08-29 | End: 2019-11-04

## 2019-09-06 NOTE — PROGRESS NOTES
Subjective   Brendan Vickers is a 54 y.o. male.     History of Present Illness   Per my note from 8/9/19, he is requesting referral to general surgery for evaluation and removal of soft tissue lump on posterior L neck. This has been present since early July. There are no associated symptoms of fever, chills, night sweats, weight loss. U/S was negative for acute disease or underlying concern. He reports that the area is now causing him irritation and mild impairment of neck ROM when exercising.     The following portions of the patient's history were reviewed and updated as appropriate: allergies, current medications, past family history, past medical history, past social history, past surgical history and problem list.    Review of Systems   Constitutional: Negative.    Respiratory: Negative.    Cardiovascular: Negative.    Skin:        L posterior neck superficial lump. L heel wart.      Objective   Physical Exam   Constitutional: He is oriented to person, place, and time. He appears well-developed and well-nourished. No distress.   Neck: Normal range of motion. Neck supple.   Pulmonary/Chest: Effort normal.   Musculoskeletal: Normal range of motion.   Lymphadenopathy:     He has no cervical adenopathy.   Neurological: He is alert and oriented to person, place, and time.   Skin: Skin is warm and dry.   L posterior neck lump, superficial, non-tender.   Wart L heel.    Psychiatric: He has a normal mood and affect.   Vitals reviewed.      Assessment/Plan   Brendan was seen today for plantar warts.    Diagnoses and all orders for this visit:    Common wart  -     Cryotherapy, Skin Lesion    Localized swelling, mass or lump of neck  -     Ambulatory Referral to General Surgery

## 2019-09-24 ENCOUNTER — OFFICE VISIT (OUTPATIENT)
Dept: SURGERY | Facility: CLINIC | Age: 54
End: 2019-09-24

## 2019-09-24 VITALS — WEIGHT: 193.8 LBS | BODY MASS INDEX: 26.25 KG/M2 | HEART RATE: 68 BPM | HEIGHT: 72 IN | OXYGEN SATURATION: 99 %

## 2019-09-24 DIAGNOSIS — D49.2 SOFT TISSUE NEOPLASM: Primary | ICD-10-CM

## 2019-09-24 PROCEDURE — 99202 OFFICE O/P NEW SF 15 MIN: CPT | Performed by: SURGERY

## 2019-09-24 NOTE — PROGRESS NOTES
Subjective   Brendan Vickers is a 54 y.o. male who presents to the office in surgical consultation from Mary Ann Chapman APRN for a soft tissue neoplasm of the posterior neck.    History of Present Illness     The patient has a long-standing subcutaneous mass of the posterior neck that is slowly getting larger and intermittently symptomatic.  He has pain when pressure is applied.  There has never been any trauma to the area.  There has never been any drainage no evidence of infection.    Review of Systems   Constitutional: Negative for activity change, appetite change, fatigue and fever.   HENT: Negative for trouble swallowing and voice change.    Respiratory: Negative for chest tightness and shortness of breath.    Cardiovascular: Negative for chest pain and palpitations.   Gastrointestinal: Negative for abdominal pain, blood in stool, constipation, diarrhea, nausea and vomiting.   Endocrine: Negative for cold intolerance and heat intolerance.   Genitourinary: Negative for dysuria and flank pain.   Neurological: Negative for dizziness and light-headedness.   Hematological: Negative for adenopathy. Does not bruise/bleed easily.   Psychiatric/Behavioral: Negative for agitation and confusion.     Past Medical History:   Diagnosis Date   • Colon polyp 06/04/2018    Transverse Colon Polyp x 2: Fragments of tubular adenoma, Repeat in 5 years, Dr. Ruggiero   • HIV disease (CMS/HCC)    • Hyperlipidemia      Past Surgical History:   Procedure Laterality Date   • BACK SURGERY  2006    Dr. Celestin   • COLONOSCOPY N/A 06/04/2018    Transverse Colon Polyp x 2: Fragments of tubular adenoma, Repeat in 5 years, Dr. Ruggiero     Family History   Problem Relation Age of Onset   • Diabetes Mother    • Heart attack Mother    • Heart disease Mother    • Breast cancer Mother    • Colon cancer Father    • Diabetes Maternal Grandmother      Social History     Socioeconomic History   • Marital status: Single     Spouse name: Not on file    • Number of children: 0   • Years of education: Not on file   • Highest education level: Not on file   Occupational History   • Occupation: Banker   Tobacco Use   • Smoking status: Never Smoker   • Smokeless tobacco: Never Used   Substance and Sexual Activity   • Alcohol use: No   • Drug use: No   • Sexual activity: Yes     Partners: Male       Objective   Physical Exam   Constitutional: He is oriented to person, place, and time. He appears well-developed and well-nourished. He is cooperative.  Non-toxic appearance.   Eyes: EOM are normal. No scleral icterus.   Pulmonary/Chest: Effort normal. No respiratory distress.   Neurological: He is alert and oriented to person, place, and time.   Skin: Skin is warm and dry.   There is a 3 cm soft tissue neoplasm of the posterior neck at the junction with the upper back.  Has well-defined margins.   Psychiatric: He has a normal mood and affect. His behavior is normal. Judgment and thought content normal.       Assessment/Plan       The encounter diagnosis was Soft tissue neoplasm.    The patient has a soft tissue neoplasm of the neck that is slowly getting larger and is intermittently symptomatic.  He has been scheduled for an excision of the soft tissue neoplasm.  The patient understands the indications, alternatives, risks, and benefits of the procedure and wishes to proceed.

## 2019-11-04 ENCOUNTER — APPOINTMENT (OUTPATIENT)
Dept: PREADMISSION TESTING | Facility: HOSPITAL | Age: 54
End: 2019-11-04

## 2019-11-04 VITALS
BODY MASS INDEX: 26.14 KG/M2 | SYSTOLIC BLOOD PRESSURE: 119 MMHG | TEMPERATURE: 97.6 F | HEIGHT: 72 IN | RESPIRATION RATE: 16 BRPM | HEART RATE: 75 BPM | OXYGEN SATURATION: 99 % | DIASTOLIC BLOOD PRESSURE: 85 MMHG | WEIGHT: 193 LBS

## 2019-11-04 LAB
ANION GAP SERPL CALCULATED.3IONS-SCNC: 6 MMOL/L (ref 5–15)
BUN BLD-MCNC: 10 MG/DL (ref 6–20)
BUN/CREAT SERPL: 7.9 (ref 7–25)
CALCIUM SPEC-SCNC: 9.3 MG/DL (ref 8.6–10.5)
CHLORIDE SERPL-SCNC: 102 MMOL/L (ref 98–107)
CO2 SERPL-SCNC: 31 MMOL/L (ref 22–29)
CREAT BLD-MCNC: 1.26 MG/DL (ref 0.76–1.27)
DEPRECATED RDW RBC AUTO: 46.1 FL (ref 37–54)
ERYTHROCYTE [DISTWIDTH] IN BLOOD BY AUTOMATED COUNT: 13.5 % (ref 12.3–15.4)
GFR SERPL CREATININE-BSD FRML MDRD: 59 ML/MIN/1.73
GLUCOSE BLD-MCNC: 166 MG/DL (ref 65–99)
HCT VFR BLD AUTO: 49.5 % (ref 37.5–51)
HGB BLD-MCNC: 16.5 G/DL (ref 13–17.7)
MCH RBC QN AUTO: 30.7 PG (ref 26.6–33)
MCHC RBC AUTO-ENTMCNC: 33.3 G/DL (ref 31.5–35.7)
MCV RBC AUTO: 92.2 FL (ref 79–97)
PLATELET # BLD AUTO: 218 10*3/MM3 (ref 140–450)
PMV BLD AUTO: 10.6 FL (ref 6–12)
POTASSIUM BLD-SCNC: 4.3 MMOL/L (ref 3.5–5.2)
RBC # BLD AUTO: 5.37 10*6/MM3 (ref 4.14–5.8)
SODIUM BLD-SCNC: 139 MMOL/L (ref 136–145)
WBC NRBC COR # BLD: 4.75 10*3/MM3 (ref 3.4–10.8)

## 2019-11-04 PROCEDURE — 80048 BASIC METABOLIC PNL TOTAL CA: CPT | Performed by: SURGERY

## 2019-11-04 PROCEDURE — 36415 COLL VENOUS BLD VENIPUNCTURE: CPT

## 2019-11-04 PROCEDURE — 85027 COMPLETE CBC AUTOMATED: CPT | Performed by: SURGERY

## 2019-11-04 RX ORDER — FLUTICASONE PROPIONATE 50 MCG
2 SPRAY, SUSPENSION (ML) NASAL DAILY PRN
COMMUNITY

## 2019-11-04 RX ORDER — SILDENAFIL 100 MG/1
100 TABLET, FILM COATED ORAL DAILY PRN
COMMUNITY
End: 2022-01-14 | Stop reason: SDUPTHER

## 2019-11-04 NOTE — DISCHARGE INSTRUCTIONS
Take the following medications the morning of surgery with a small sip of water:    NONE    ARRIVE AT 6:30    General Instructions:  • Do not eat solid food after midnight the night before surgery.  • You may drink clear liquids day of surgery but must stop at least one hour before your hospital arrival time.  • It is beneficial for you to have a clear drink that contains carbohydrates the day of surgery.  We suggest a 12 to 20 ounce bottle of Gatorade or Powerade for non-diabetic patients or a 12 to 20 ounce bottle of G2 or Powerade Zero for diabetic patients. (Pediatric patients, are not advised to drink a 12 to 20 ounce carbohydrate drink)    Clear liquids are liquids you can see through.  Nothing red in color.     Plain water                               Sports drinks  Sodas                                   Gelatin (Jell-O)  Fruit juices without pulp such as white grape juice and apple juice  Popsicles that contain no fruit or yogurt  Tea or coffee (no cream or milk added)  Gatorade / Powerade  G2 / Powerade Zero    • Infants may have breast milk up to four hours before surgery.  • Infants drinking formula may drink formula up to six hours before surgery.   • Patients who avoid smoking, chewing tobacco and alcohol for 4 weeks prior to surgery have a reduced risk of post-operative complications.  Quit smoking as many days before surgery as you can.  • Do not smoke, use chewing tobacco or drink alcohol the day of surgery.   • If applicable bring your C-PAP/ BI-PAP machine.  • Bring any papers given to you in the doctor’s office.  • Wear clean comfortable clothes.  • Do not wear contact lenses, false eyelashes or make-up.  Bring a case for your glasses.   • Bring crutches or walker if applicable.  • Remove all piercings.  Leave jewelry and any other valuables at home.  • Hair extensions with metal clips must be removed prior to surgery.  • The Pre-Admission Testing nurse will instruct you to bring medications if  unable to obtain an accurate list in Pre-Admission Testing.        If you were given a blood bank ID arm band remember to bring it with you the day of surgery.    Preventing a Surgical Site Infection:  • For 2 to 3 days before surgery, avoid shaving with a razor because the razor can irritate skin and make it easier to develop an infection.    • Any areas of open skin can increase the risk of a post-operative wound infection by allowing bacteria to enter and travel throughout the body.  Notify your surgeon if you have any skin wounds / rashes even if it is not near the expected surgical site.  The area will need assessed to determine if surgery should be delayed until it is healed.  • The night prior to surgery sleep in a clean bed with clean clothing.  Do not allow pets to sleep with you.  • Shower on the morning of surgery using a fresh bar of anti-bacterial soap (such as Dial) and clean washcloth.  Dry with a clean towel and dress in clean clothing.  • Ask your surgeon if you will be receiving antibiotics prior to surgery.  • Make sure you, your family, and all healthcare providers clean their hands with soap and water or an alcohol based hand  before caring for you or your wound.    Day of surgery:  Your arrival time is approximately two hours before your scheduled surgery time.  Upon arrival, a Pre-op nurse and Anesthesiologist will review your health history, obtain vital signs, and answer questions you may have.  The only belongings needed at this time will be a list of your home medications and if applicable your C-PAP/BI-PAP machine.  If you are staying overnight your family can leave the rest of your belongings in the car and bring them to your room later.  A Pre-op nurse will start an IV and you may receive medication in preparation for surgery, including something to help you relax.  Your family will be able to see you in the Pre-op area.  Two visitors at a time will be allowed in the Pre-op  room.  While you are in surgery your family should notify the waiting room  if they leave the waiting room area and provide a contact phone number.    Please be aware that surgery does come with discomfort.  We want to make every effort to control your discomfort so please discuss any uncontrolled symptoms with your nurse.   Your doctor will most likely have prescribed pain medications.      If you are going home after surgery you will receive individualized written care instructions before being discharged.  A responsible adult must drive you to and from the hospital on the day of your surgery and stay with you for 24 hours.    If you are staying overnight following surgery, you will be transported to your hospital room following the recovery period.  [facility] has all private rooms.    You have received a list of surgical assistants for your reference.  If you have any questions please call Pre-Admission Testing at [phone].  Deductibles and co-payments are collected on the day of service. Please be prepared to pay the required co-pay, deductible or deposit on the day of service as defined by your plan.

## 2019-11-11 ENCOUNTER — ANESTHESIA EVENT (OUTPATIENT)
Dept: PERIOP | Facility: HOSPITAL | Age: 54
End: 2019-11-11

## 2019-11-11 ENCOUNTER — HOSPITAL ENCOUNTER (OUTPATIENT)
Facility: HOSPITAL | Age: 54
Setting detail: HOSPITAL OUTPATIENT SURGERY
Discharge: HOME | End: 2019-11-11
Attending: SURGERY | Admitting: SURGERY

## 2019-11-11 ENCOUNTER — ANESTHESIA (OUTPATIENT)
Dept: PERIOP | Facility: HOSPITAL | Age: 54
End: 2019-11-11

## 2019-11-11 VITALS
OXYGEN SATURATION: 98 % | SYSTOLIC BLOOD PRESSURE: 124 MMHG | RESPIRATION RATE: 16 BRPM | DIASTOLIC BLOOD PRESSURE: 85 MMHG | WEIGHT: 192.13 LBS | HEART RATE: 76 BPM | BODY MASS INDEX: 26.02 KG/M2 | HEIGHT: 72 IN | TEMPERATURE: 97.4 F

## 2019-11-11 DIAGNOSIS — D49.2 SOFT TISSUE NEOPLASM: ICD-10-CM

## 2019-11-11 PROCEDURE — 21931 EXC BACK LES SC 3 CM/>: CPT | Performed by: SURGERY

## 2019-11-11 PROCEDURE — 25010000002 MIDAZOLAM PER 1 MG: Performed by: NURSE ANESTHETIST, CERTIFIED REGISTERED

## 2019-11-11 PROCEDURE — 25010000002 FENTANYL CITRATE (PF) 100 MCG/2ML SOLUTION: Performed by: NURSE ANESTHETIST, CERTIFIED REGISTERED

## 2019-11-11 PROCEDURE — 88304 TISSUE EXAM BY PATHOLOGIST: CPT | Performed by: SURGERY

## 2019-11-11 PROCEDURE — S0260 H&P FOR SURGERY: HCPCS | Performed by: SURGERY

## 2019-11-11 PROCEDURE — 25010000002 PROPOFOL 10 MG/ML EMULSION: Performed by: NURSE ANESTHETIST, CERTIFIED REGISTERED

## 2019-11-11 RX ORDER — PROPOFOL 10 MG/ML
VIAL (ML) INTRAVENOUS CONTINUOUS PRN
Status: DISCONTINUED | OUTPATIENT
Start: 2019-11-11 | End: 2019-11-11 | Stop reason: SURG

## 2019-11-11 RX ORDER — LIDOCAINE HYDROCHLORIDE 10 MG/ML
INJECTION, SOLUTION EPIDURAL; INFILTRATION; INTRACAUDAL; PERINEURAL AS NEEDED
Status: DISCONTINUED | OUTPATIENT
Start: 2019-11-11 | End: 2019-11-11 | Stop reason: HOSPADM

## 2019-11-11 RX ORDER — HYDRALAZINE HYDROCHLORIDE 20 MG/ML
5 INJECTION INTRAMUSCULAR; INTRAVENOUS
Status: CANCELLED | OUTPATIENT
Start: 2019-11-11

## 2019-11-11 RX ORDER — OXYCODONE AND ACETAMINOPHEN 7.5; 325 MG/1; MG/1
1 TABLET ORAL ONCE AS NEEDED
Status: CANCELLED | OUTPATIENT
Start: 2019-11-11

## 2019-11-11 RX ORDER — NALOXONE HCL 0.4 MG/ML
0.2 VIAL (ML) INJECTION AS NEEDED
Status: CANCELLED | OUTPATIENT
Start: 2019-11-11

## 2019-11-11 RX ORDER — FLUMAZENIL 0.1 MG/ML
0.2 INJECTION INTRAVENOUS AS NEEDED
Status: CANCELLED | OUTPATIENT
Start: 2019-11-11

## 2019-11-11 RX ORDER — SODIUM CHLORIDE, SODIUM LACTATE, POTASSIUM CHLORIDE, CALCIUM CHLORIDE 600; 310; 30; 20 MG/100ML; MG/100ML; MG/100ML; MG/100ML
9 INJECTION, SOLUTION INTRAVENOUS CONTINUOUS
Status: DISCONTINUED | OUTPATIENT
Start: 2019-11-11 | End: 2019-11-11 | Stop reason: HOSPADM

## 2019-11-11 RX ORDER — DIPHENHYDRAMINE HYDROCHLORIDE 50 MG/ML
12.5 INJECTION INTRAMUSCULAR; INTRAVENOUS
Status: CANCELLED | OUTPATIENT
Start: 2019-11-11

## 2019-11-11 RX ORDER — EPHEDRINE SULFATE 50 MG/ML
5 INJECTION, SOLUTION INTRAVENOUS ONCE AS NEEDED
Status: CANCELLED | OUTPATIENT
Start: 2019-11-11

## 2019-11-11 RX ORDER — SODIUM CHLORIDE 0.9 % (FLUSH) 0.9 %
3 SYRINGE (ML) INJECTION EVERY 12 HOURS SCHEDULED
Status: DISCONTINUED | OUTPATIENT
Start: 2019-11-11 | End: 2019-11-11 | Stop reason: HOSPADM

## 2019-11-11 RX ORDER — FENTANYL CITRATE 50 UG/ML
INJECTION, SOLUTION INTRAMUSCULAR; INTRAVENOUS AS NEEDED
Status: DISCONTINUED | OUTPATIENT
Start: 2019-11-11 | End: 2019-11-11 | Stop reason: SURG

## 2019-11-11 RX ORDER — MAGNESIUM HYDROXIDE 1200 MG/15ML
LIQUID ORAL AS NEEDED
Status: DISCONTINUED | OUTPATIENT
Start: 2019-11-11 | End: 2019-11-11 | Stop reason: HOSPADM

## 2019-11-11 RX ORDER — LABETALOL HYDROCHLORIDE 5 MG/ML
5 INJECTION, SOLUTION INTRAVENOUS
Status: CANCELLED | OUTPATIENT
Start: 2019-11-11

## 2019-11-11 RX ORDER — ONDANSETRON 2 MG/ML
4 INJECTION INTRAMUSCULAR; INTRAVENOUS ONCE AS NEEDED
Status: CANCELLED | OUTPATIENT
Start: 2019-11-11

## 2019-11-11 RX ORDER — LIDOCAINE HYDROCHLORIDE 10 MG/ML
0.5 INJECTION, SOLUTION EPIDURAL; INFILTRATION; INTRACAUDAL; PERINEURAL ONCE AS NEEDED
Status: DISCONTINUED | OUTPATIENT
Start: 2019-11-11 | End: 2019-11-11 | Stop reason: HOSPADM

## 2019-11-11 RX ORDER — MIDAZOLAM HYDROCHLORIDE 1 MG/ML
INJECTION INTRAMUSCULAR; INTRAVENOUS AS NEEDED
Status: DISCONTINUED | OUTPATIENT
Start: 2019-11-11 | End: 2019-11-11 | Stop reason: SURG

## 2019-11-11 RX ORDER — ACETAMINOPHEN 325 MG/1
650 TABLET ORAL ONCE AS NEEDED
Status: CANCELLED | OUTPATIENT
Start: 2019-11-11

## 2019-11-11 RX ORDER — MIDAZOLAM HYDROCHLORIDE 1 MG/ML
1 INJECTION INTRAMUSCULAR; INTRAVENOUS
Status: DISCONTINUED | OUTPATIENT
Start: 2019-11-11 | End: 2019-11-11 | Stop reason: HOSPADM

## 2019-11-11 RX ORDER — SODIUM CHLORIDE 0.9 % (FLUSH) 0.9 %
3-10 SYRINGE (ML) INJECTION AS NEEDED
Status: DISCONTINUED | OUTPATIENT
Start: 2019-11-11 | End: 2019-11-11 | Stop reason: HOSPADM

## 2019-11-11 RX ORDER — LIDOCAINE HYDROCHLORIDE 20 MG/ML
INJECTION, SOLUTION INFILTRATION; PERINEURAL AS NEEDED
Status: DISCONTINUED | OUTPATIENT
Start: 2019-11-11 | End: 2019-11-11 | Stop reason: SURG

## 2019-11-11 RX ORDER — MIDAZOLAM HYDROCHLORIDE 1 MG/ML
2 INJECTION INTRAMUSCULAR; INTRAVENOUS
Status: DISCONTINUED | OUTPATIENT
Start: 2019-11-11 | End: 2019-11-11 | Stop reason: HOSPADM

## 2019-11-11 RX ORDER — DIPHENHYDRAMINE HCL 25 MG
25 CAPSULE ORAL
Status: CANCELLED | OUTPATIENT
Start: 2019-11-11

## 2019-11-11 RX ORDER — FENTANYL CITRATE 50 UG/ML
50 INJECTION, SOLUTION INTRAMUSCULAR; INTRAVENOUS
Status: CANCELLED | OUTPATIENT
Start: 2019-11-11

## 2019-11-11 RX ORDER — OXYCODONE HYDROCHLORIDE AND ACETAMINOPHEN 5; 325 MG/1; MG/1
TABLET ORAL
Qty: 16 TABLET | Refills: 0 | Status: SHIPPED | OUTPATIENT
Start: 2019-11-11 | End: 2019-11-26

## 2019-11-11 RX ADMIN — FENTANYL CITRATE 25 MCG: 50 INJECTION INTRAMUSCULAR; INTRAVENOUS at 09:46

## 2019-11-11 RX ADMIN — PROPOFOL 100 MCG/KG/MIN: 10 INJECTION, EMULSION INTRAVENOUS at 09:38

## 2019-11-11 RX ADMIN — SODIUM CHLORIDE, POTASSIUM CHLORIDE, SODIUM LACTATE AND CALCIUM CHLORIDE: 600; 310; 30; 20 INJECTION, SOLUTION INTRAVENOUS at 09:33

## 2019-11-11 RX ADMIN — Medication 2 MG: at 09:33

## 2019-11-11 RX ADMIN — LIDOCAINE HYDROCHLORIDE 100 MG: 20 INJECTION, SOLUTION INFILTRATION; PERINEURAL at 09:38

## 2019-11-11 NOTE — H&P
Subjective      Brendan Vickers is a 54 y.o. male who presents for a soft tissue neoplasm of the posterior neck.     History of Present Illness      The patient has a long-standing subcutaneous mass of the posterior neck that is slowly getting larger and intermittently symptomatic.  He has pain when pressure is applied.  There has never been any trauma to the area.  There has never been any drainage no evidence of infection.     Review of Systems   Constitutional: Negative for activity change, appetite change, fatigue and fever.   HENT: Negative for trouble swallowing and voice change.    Respiratory: Negative for chest tightness and shortness of breath.    Cardiovascular: Negative for chest pain and palpitations.   Gastrointestinal: Negative for abdominal pain, blood in stool, constipation, diarrhea, nausea and vomiting.   Endocrine: Negative for cold intolerance and heat intolerance.   Genitourinary: Negative for dysuria and flank pain.   Neurological: Negative for dizziness and light-headedness.   Hematological: Negative for adenopathy. Does not bruise/bleed easily.   Psychiatric/Behavioral: Negative for agitation and confusion.      Medical History        Past Medical History:   Diagnosis Date   • Colon polyp 06/04/2018     Transverse Colon Polyp x 2: Fragments of tubular adenoma, Repeat in 5 years, Dr. Ruggiero   • HIV disease (CMS/HCC)     • Hyperlipidemia           Surgical History         Past Surgical History:   Procedure Laterality Date   • BACK SURGERY   2006     Dr. Celestin   • COLONOSCOPY N/A 06/04/2018     Transverse Colon Polyp x 2: Fragments of tubular adenoma, Repeat in 5 years, Dr. Ruggiero               Family History   Problem Relation Age of Onset   • Diabetes Mother     • Heart attack Mother     • Heart disease Mother     • Breast cancer Mother     • Colon cancer Father     • Diabetes Maternal Grandmother        Social History   Social History            Socioeconomic History   • Marital  status: Single       Spouse name: Not on file   • Number of children: 0   • Years of education: Not on file   • Highest education level: Not on file   Occupational History   • Occupation: Banker   Tobacco Use   • Smoking status: Never Smoker   • Smokeless tobacco: Never Used   Substance and Sexual Activity   • Alcohol use: No   • Drug use: No   • Sexual activity: Yes       Partners: Male               Objective      Physical Exam   Constitutional: He is oriented to person, place, and time. He appears well-developed and well-nourished. He is cooperative.  Non-toxic appearance.   Eyes: EOM are normal. No scleral icterus.   Pulmonary/Chest: Effort normal. No respiratory distress.   Neurological: He is alert and oriented to person, place, and time.   Skin: Skin is warm and dry.   There is a 3 cm soft tissue neoplasm of the posterior neck at the junction with the upper back.  Has well-defined margins.   Psychiatric: He has a normal mood and affect. His behavior is normal. Judgment and thought content normal.            Assessment/Plan            The encounter diagnosis was Soft tissue neoplasm.     The patient has a soft tissue neoplasm of the neck that is slowly getting larger and is intermittently symptomatic.  He has been scheduled for an excision of the soft tissue neoplasm.  The patient understands the indications, alternatives, risks, and benefits of the procedure and wishes to proceed.

## 2019-11-11 NOTE — ANESTHESIA PREPROCEDURE EVALUATION
Anesthesia Evaluation     Patient summary reviewed and Nursing notes reviewed   no history of anesthetic complications:  NPO Solid Status: > 8 hours  NPO Liquid Status: > 2 hours           Airway   Mallampati: III  TM distance: >3 FB  Neck ROM: full  No difficulty expected  Dental - normal exam     Pulmonary     breath sounds clear to auscultation  Cardiovascular   Exercise tolerance: good (4-7 METS)    Rhythm: regular  Rate: normal    (+) hyperlipidemia,       Neuro/Psych  GI/Hepatic/Renal/Endo      Musculoskeletal     Abdominal    Substance History      OB/GYN          Other          Other Comment: HIV                  Anesthesia Plan    ASA 2     MAC     intravenous induction     Anesthetic plan, all risks, benefits, and alternatives have been provided, discussed and informed consent has been obtained with: patient.    Plan discussed with CRNA.

## 2019-11-11 NOTE — ANESTHESIA POSTPROCEDURE EVALUATION
Patient: Brendan Vickers    Procedure Summary     Date:  11/11/19 Room / Location:  [facility] OR 03 / [facility] MAIN OR    Anesthesia Start:  0933 Anesthesia Stop:  1029    Procedure:  Excision of soft tissue neoplasm of upper back (N/A ) Diagnosis:       Soft tissue neoplasm      (Soft tissue neoplasm [D49.2])    Surgeon:  Efe Lopez Jr., MD Provider:  Estelle Price MD    Anesthesia Type:  MAC ASA Status:  2          Anesthesia Type: MAC  Last vitals  BP   107/81 (11/11/19 1025)   Temp   36.3 °C (97.4 °F) (11/11/19 1025)   Pulse   75 (11/11/19 1025)   Resp   16 (11/11/19 1025)     SpO2   97 % (11/11/19 1025)     Post Anesthesia Care and Evaluation    Patient location during evaluation: PACU  Patient participation: complete - patient participated  Level of consciousness: awake and alert  Pain management: adequate  Airway patency: patent  Anesthetic complications: No anesthetic complications    Cardiovascular status: acceptable  Respiratory status: acceptable  Hydration status: acceptable    Comments: ---------------------------               11/11/19                      1025         ---------------------------   BP:          107/81         Pulse:         75           Resp:          16           Temp:   36.3 °C (97.4 °F)   SpO2:          97%         ---------------------------

## 2019-11-11 NOTE — OP NOTE
Surgeon: Efe Lopez Jr., M.D.    Assistant: None    Pre-Operative Diagnosis:     Soft tissue neoplasm [D49.2]    Post-Operative Diagnosis:    Post-Op Diagnosis Codes:     * Soft tissue neoplasm [D49.2]    Procedure Performed:     Excision of 3.8 cm soft tissue neoplasm of left upper back    Indications:     The patient is a pleasant 54-year-old male with a soft tissue neoplasm of the upper back that has gotten larger and increasingly symptomatic.  He presents for excision of soft tissue neoplasm.  The patient understands the indications, alternatives, risks, and benefits of the procedure and wishes to proceed.    Procedure:     The patient was identified and taken to the operating room where he was placed in the right side down lateral decubitus position on the operating table.  He underwent a MAC anesthesia and was appropriate monitored throughout the case by the anesthesia personnel.  His left upper back and lower neck were prepped and draped in the standard surgical fashion.  The area of the soft tissue neoplasm was infiltrated with 1% lidocaine without epinephrine.  An incision was made with a scalpel carried through the skin into the subcutaneous tissue.  The subcutaneous tissue was divided using both electrocautery down to the soft tissue neoplasm which was removed from subcutaneous attachments.  It was passed off for pathology after measuring at 3.8 cm.  The area was again infiltrated with 1% lidocaine without epinephrine.  Hemostasis was established using Bovie electrocautery.  The subcutaneous tissue was reapproximated using 3-0 Vicryl suture in a running fashion.  The skin was closed with a 4-0 Monocryl in a subcuticular fashion followed by Mastisol and Steri-Strips.  Sterile dressing was applied.  Sponge, needle, and instrument counts were correct at the end of the case.  The patient tolerated procedure well and was transferred to the recovery room in stable condition.    Estimated Blood Loss:       minimal    Specimens:       Order Name Source Comment Collection Info Order Time   TISSUE PATHOLOGY EXAM Back, Upper  Collected By: Efe Lopez Jr., MD 11/11/2019 10:02 AM       Efe Lopez Jr., M.D.

## 2019-11-18 LAB
CYTO UR: NORMAL
DX PRELIMINARY: NORMAL
LAB AP CASE REPORT: NORMAL
LAB AP DIAGNOSIS COMMENT: NORMAL
PATH REPORT.FINAL DX SPEC: NORMAL
PATH REPORT.GROSS SPEC: NORMAL

## 2019-11-26 ENCOUNTER — OFFICE VISIT (OUTPATIENT)
Dept: SURGERY | Facility: CLINIC | Age: 54
End: 2019-11-26

## 2019-11-26 VITALS — HEART RATE: 85 BPM | OXYGEN SATURATION: 98 %

## 2019-11-26 DIAGNOSIS — Z48.89 POSTOPERATIVE VISIT: Primary | ICD-10-CM

## 2019-11-26 PROCEDURE — 99024 POSTOP FOLLOW-UP VISIT: CPT | Performed by: SURGERY

## 2019-11-26 NOTE — PROGRESS NOTES
Subjective   Brendan Vickers is a 54 y.o. male who returns to the office after undergoing an excision of the soft tissue neoplasm on 11/11/2019.     History of Present Illness     The patient is recovering well with no significant postop symptoms.  He is having no pain.  There was some minimal drainage over 2 or 3 days which has stopped.  It was clear thin fluid.  His energy level is good.      The pathology shows a lipoma with fat necrosis and mild chronic inflammation.    Review of Systems   Constitutional: Negative for fatigue and fever.   Respiratory: Negative for chest tightness and shortness of breath.    Cardiovascular: Negative for chest pain and palpitations.   Gastrointestinal: Negative for abdominal pain, blood in stool, constipation, diarrhea, nausea and vomiting.       Objective   Physical Exam   Constitutional:  Non-toxic appearance. He does not appear ill. No distress.   Pulmonary/Chest: Effort normal. No respiratory distress.   Abdominal: Soft. Normal appearance. There is no tenderness.   Neurological: He is alert.   Skin:   Incision: intact with no evidence of infection.   Psychiatric: He has a normal mood and affect. His behavior is normal.       Assessment/Plan   The encounter diagnosis was Postoperative visit.    The patient is recovering well from the excision of a soft tissue neoplasm.  The pathology is benign.  At this point he has no limitations.  He will follow-up on an as-needed basis.

## 2020-01-16 ENCOUNTER — OFFICE VISIT (OUTPATIENT)
Dept: INTERNAL MEDICINE | Age: 55
End: 2020-01-16

## 2020-01-16 VITALS
OXYGEN SATURATION: 99 % | TEMPERATURE: 98.2 F | HEIGHT: 72 IN | BODY MASS INDEX: 26.63 KG/M2 | SYSTOLIC BLOOD PRESSURE: 136 MMHG | DIASTOLIC BLOOD PRESSURE: 86 MMHG | WEIGHT: 196.6 LBS | HEART RATE: 96 BPM

## 2020-01-16 DIAGNOSIS — R05.9 COUGH: Primary | ICD-10-CM

## 2020-01-16 PROCEDURE — 99213 OFFICE O/P EST LOW 20 MIN: CPT | Performed by: NURSE PRACTITIONER

## 2020-01-16 RX ORDER — BENZONATATE 200 MG/1
200 CAPSULE ORAL 3 TIMES DAILY PRN
Qty: 30 CAPSULE | Refills: 0 | Status: SHIPPED | OUTPATIENT
Start: 2020-01-16 | End: 2020-03-05

## 2020-01-16 NOTE — PATIENT INSTRUCTIONS

## 2020-01-16 NOTE — PROGRESS NOTES
[facility]  Mary Ann Cardona V Vickers / 54 y.o. / male  01/16/2020      ASSESSMENT & PLAN:    Problem List Items Addressed This Visit     None      Visit Diagnoses     Cough    -  Primary    Relevant Medications    benzonatate (TESSALON) 200 MG capsule        No orders of the defined types were placed in this encounter.    New Medications Ordered This Visit   Medications   • benzonatate (TESSALON) 200 MG capsule     Sig: Take 1 capsule by mouth 3 (Three) Times a Day As Needed for Cough.     Dispense:  30 capsule     Refill:  0       Summary/Discussion:    1. Cough  Discussed with patient cough likely as result of vasomotor sinusitis versus viral.  No evidence of active infection on exam today.  Recommend conservative treatment of symptoms with benzonatate Perles as needed. May consider restarting flonase spray for 1-2 weeks.   Follow-up if any worsening cough or new symptoms such as chest pain, worsening shortness of breath, fever, purulent sputum.    - benzonatate (TESSALON) 200 MG capsule; Take 1 capsule by mouth 3 (Three) Times a Day As Needed for Cough.  Dispense: 30 capsule; Refill: 0        Return in about 5 days (around 1/21/2020), or if symptoms worsen or fail to improve.    ____________________________________________________________________    MEDICATIONS  Current Outpatient Medications   Medication Sig Dispense Refill   • atorvastatin (LIPITOR) 40 MG tablet Take 40 mg by mouth Every Night.     • BIKTARVY -25 MG per tablet Take 1 tablet by mouth Every Night.     • fluticasone (FLONASE) 50 MCG/ACT nasal spray 2 sprays into the nostril(s) as directed by provider Daily As Needed for Rhinitis.     • Multiple Vitamins-Minerals (MULTIVITAMIN PO) Take 1 tablet by mouth Daily. PT HOLDING FOR SURGERY     • sildenafil (VIAGRA) 100 MG tablet Take 100 mg by mouth Daily As Needed for erectile dysfunction. TO HOLD 3 TO 5 DAYS BEFORE SURGERY     • benzonatate (TESSALON) 200 MG capsule Take 1  "capsule by mouth 3 (Three) Times a Day As Needed for Cough. 30 capsule 0     No current facility-administered medications for this visit.           VITALS:    Visit Vitals  /86   Pulse 96   Temp 98.2 °F (36.8 °C)   Ht 182.9 cm (72.01\")   Wt 89.2 kg (196 lb 9.6 oz)   SpO2 99%   BMI 26.66 kg/m²       BP Readings from Last 3 Encounters:   01/16/20 136/86   11/11/19 124/85   11/04/19 119/85     Wt Readings from Last 3 Encounters:   01/16/20 89.2 kg (196 lb 9.6 oz)   11/11/19 87.1 kg (192 lb 2 oz)   11/04/19 87.5 kg (193 lb)      Body mass index is 26.66 kg/m².    CC:  Main reason(s) for today's visit: Cough and Laryngitis      HPI:     Cough: Patient complains of nonproductive cough.  Symptoms began 2 days ago.  The cough is non-productive, without wheezing, dyspnea or hemoptysis and is aggravated by nothing Associated symptoms include:change in voice. Patient does not have new pets. Patient does not have a history of asthma. Patient does have a history of environmental allergens. Patient does not have recent travel. Patient does not have a history of smoking.  He reports he Took Nyquil last night and woke up \"unable to breathe\" for a few seconds. Has not had any shortness of breath during the day or at any other time. Denies any nasal congestion or sinus symptoms.       Patient Care Team:  Mary Ann Chapman APRN as PCP - General (Internal Medicine)  Amira Wu MD as Consulting Physician (Infectious Diseases)  Susan Trinidad MD as Consulting Physician (Dermatology)  Monik, Christophe Clifton MD as Consulting Physician (Gastroenterology)    ____________________________________________________________________    REVIEW OF SYSTEMS    Review of Systems   Constitutional: Negative for fever.   HENT: Negative for congestion, ear pain, postnasal drip, rhinorrhea, sinus pressure, sneezing, sore throat and trouble swallowing.    Eyes: Negative for discharge, redness and itching.   Respiratory: Positive for " cough. Negative for shortness of breath and wheezing.    Cardiovascular: Negative for chest pain.   Gastrointestinal: Negative for diarrhea, nausea and vomiting.   Musculoskeletal: Negative for arthralgias and myalgias.   Hematological: Negative for adenopathy.         PHYSICAL EXAMINATION    Physical Exam   Constitutional: He is oriented to person, place, and time. Vital signs are normal. He appears well-developed and well-nourished. He is cooperative. He does not appear ill. No distress.   HENT:   Head: Normocephalic and atraumatic.   Right Ear: Hearing, external ear and ear canal normal. No drainage or swelling. Tympanic membrane is not injected and not erythematous. A middle ear effusion is present.   Left Ear: Hearing, external ear and ear canal normal. No drainage or swelling. Tympanic membrane is not injected and not erythematous. A middle ear effusion is present.   Nose: Nose normal.   Mouth/Throat: Uvula is midline, oropharynx is clear and moist and mucous membranes are normal. No tonsillar exudate.   Cardiovascular: Normal rate, regular rhythm and normal heart sounds.   No murmur heard.  Pulmonary/Chest: Effort normal and breath sounds normal. He has no decreased breath sounds. He has no wheezes. He has no rhonchi. He has no rales.   Lymphadenopathy:     He has no cervical adenopathy.        Right cervical: No superficial cervical, no deep cervical and no posterior cervical adenopathy present.       Left cervical: No superficial cervical, no deep cervical and no posterior cervical adenopathy present.   Neurological: He is alert and oriented to person, place, and time.   Skin: Skin is warm, dry and intact.   Psychiatric: He has a normal mood and affect. His speech is normal and behavior is normal. Judgment and thought content normal. Cognition and memory are normal.   Nursing note and vitals reviewed.      REVIEWED DATA:    Labs:     Lab Results   Component Value Date     11/04/2019    K 4.3  11/04/2019    AST 38 10/29/2018    ALT 46 (H) 10/29/2018    BUN 10 11/04/2019    CREATININE 1.26 11/04/2019    CREATININE 1.33 (H) 10/29/2018    EGFRIFNONA 59 (L) 11/04/2019    EGFRIFAFRI 68 10/29/2018       Lab Results   Component Value Date    GLUCOSE 166 (H) 11/04/2019       Lab Results   Component Value Date    LDL 14 10/29/2018    HDL 30 (L) 10/29/2018    TRIG 168 (H) 10/29/2018    CHOLHDLRATIO 2.60 10/29/2018       Lab Results   Component Value Date    TSH 2.99 10/29/2018       Lab Results   Component Value Date    WBC 4.75 11/04/2019    HGB 16.5 11/04/2019    HGB 16.5 10/29/2018     11/04/2019       Lab Results   Component Value Date    PROTEIN Negative 10/29/2018    GLUCOSEU Negative 10/29/2018    BLOODU Negative 10/29/2018    NITRITEU Negative 10/29/2018    LEUKOCYTESUR Negative 10/29/2018       Imaging:         Medical Tests:         Summary of old records / correspondence / consultant report:         Request outside records:         ALLERGIES  No Known Allergies     PFSH:     The following portions of the patient's history were reviewed and updated as appropriate: Allergies / Current Medications / Past Medical History / Surgical History / Social History / Family History    PROBLEM LIST   Patient Active Problem List   Diagnosis   • Lumbar radiculopathy, right   • HIV infection (CMS/HCC)   • Hyperlipidemia   • Adenomatous polyp of transverse colon   • Soft tissue neoplasm       PAST MEDICAL HISTORY  Past Medical History:   Diagnosis Date   • Colon polyp 06/04/2018    Transverse Colon Polyp x 2: Fragments of tubular adenoma, Repeat in 5 years, Dr. Ruggiero   • ED (erectile dysfunction)    • HIV disease (CMS/HCC)    • Hyperlipidemia    • Soft tissue neoplasm     UPPER BACK       SURGICAL HISTORY  Past Surgical History:   Procedure Laterality Date   • BACK SURGERY  2006    Dr. Celestin   • COLONOSCOPY N/A 06/04/2018    Transverse Colon Polyp x 2: Fragments of tubular adenoma, Repeat in 5 years,   Monik   • EXCISION MASS TRUNK N/A 11/11/2019    Procedure: Excision of soft tissue neoplasm of upper back;  Surgeon: Efe Lopez Jr., MD;  Location: [facility];  Service: General       SOCIAL HISTORY  Social History     Socioeconomic History   • Marital status: Single     Spouse name: Not on file   • Number of children: 0   • Years of education: Not on file   • Highest education level: Not on file   Occupational History   • Occupation: Banker   Tobacco Use   • Smoking status: Never Smoker   • Smokeless tobacco: Never Used   Substance and Sexual Activity   • Alcohol use: No   • Drug use: No   • Sexual activity: Yes     Partners: Male       FAMILY HISTORY  Family History   Problem Relation Age of Onset   • Diabetes Mother    • Heart attack Mother    • Heart disease Mother    • Breast cancer Mother    • Colon cancer Father    • Diabetes Maternal Grandmother    • Malig Hyperthermia Neg Hx          **Dragon Disclaimer:   Much of this encounter note is an electronic transcription/translation of spoken language to printed text. The electronic translation of spoken language may permit erroneous, or at times, nonsensical words or phrases to be inadvertently transcribed. Although I have reviewed the note for such errors, some may still exist.

## 2020-02-07 ENCOUNTER — RESULTS ENCOUNTER (OUTPATIENT)
Dept: INTERNAL MEDICINE | Age: 55
End: 2020-02-07

## 2020-02-07 DIAGNOSIS — Z00.00 PREVENTATIVE HEALTH CARE: Primary | ICD-10-CM

## 2020-02-07 DIAGNOSIS — Z00.00 PREVENTATIVE HEALTH CARE: ICD-10-CM

## 2020-02-10 DIAGNOSIS — Z00.00 PREVENTATIVE HEALTH CARE: ICD-10-CM

## 2020-02-12 ENCOUNTER — RESULTS ENCOUNTER (OUTPATIENT)
Dept: INTERNAL MEDICINE | Age: 55
End: 2020-02-12

## 2020-02-12 DIAGNOSIS — Z00.00 PREVENTATIVE HEALTH CARE: ICD-10-CM

## 2020-02-27 LAB
ALBUMIN SERPL-MCNC: 4.2 G/DL (ref 3.5–5.2)
ALBUMIN/GLOB SERPL: 1.5 G/DL
ALP SERPL-CCNC: 68 U/L (ref 39–117)
ALT SERPL-CCNC: 47 U/L (ref 1–41)
APPEARANCE UR: CLEAR
AST SERPL-CCNC: 30 U/L (ref 1–40)
BASOPHILS # BLD AUTO: 0.01 10*3/MM3 (ref 0–0.2)
BASOPHILS NFR BLD AUTO: 0.2 % (ref 0–1.5)
BILIRUB SERPL-MCNC: 0.4 MG/DL (ref 0.2–1.2)
BILIRUB UR QL STRIP: NEGATIVE
BUN SERPL-MCNC: 11 MG/DL (ref 6–20)
BUN/CREAT SERPL: 9.3 (ref 7–25)
CALCIUM SERPL-MCNC: 9.1 MG/DL (ref 8.6–10.5)
CHLORIDE SERPL-SCNC: 102 MMOL/L (ref 98–107)
CHOLEST SERPL-MCNC: 80 MG/DL (ref 0–200)
CHOLEST/HDLC SERPL: 2.86 {RATIO}
CO2 SERPL-SCNC: 25.9 MMOL/L (ref 22–29)
COLOR UR: YELLOW
CREAT SERPL-MCNC: 1.18 MG/DL (ref 0.76–1.27)
EOSINOPHIL # BLD AUTO: 0.07 10*3/MM3 (ref 0–0.4)
EOSINOPHIL NFR BLD AUTO: 1.3 % (ref 0.3–6.2)
ERYTHROCYTE [DISTWIDTH] IN BLOOD BY AUTOMATED COUNT: 13.3 % (ref 12.3–15.4)
GLOBULIN SER CALC-MCNC: 2.8 GM/DL
GLUCOSE SERPL-MCNC: 93 MG/DL (ref 65–99)
GLUCOSE UR QL: NEGATIVE
HCT VFR BLD AUTO: 47.1 % (ref 37.5–51)
HDLC SERPL-MCNC: 28 MG/DL (ref 40–60)
HGB BLD-MCNC: 16 G/DL (ref 13–17.7)
HGB UR QL STRIP: NEGATIVE
IMM GRANULOCYTES # BLD AUTO: 0.03 10*3/MM3 (ref 0–0.05)
IMM GRANULOCYTES NFR BLD AUTO: 0.6 % (ref 0–0.5)
KETONES UR QL STRIP: NEGATIVE
LDLC SERPL CALC-MCNC: 25 MG/DL (ref 0–100)
LEUKOCYTE ESTERASE UR QL STRIP: NEGATIVE
LYMPHOCYTES # BLD AUTO: 2.2 10*3/MM3 (ref 0.7–3.1)
LYMPHOCYTES NFR BLD AUTO: 40.5 % (ref 19.6–45.3)
MCH RBC QN AUTO: 31.1 PG (ref 26.6–33)
MCHC RBC AUTO-ENTMCNC: 34 G/DL (ref 31.5–35.7)
MCV RBC AUTO: 91.6 FL (ref 79–97)
MONOCYTES # BLD AUTO: 0.58 10*3/MM3 (ref 0.1–0.9)
MONOCYTES NFR BLD AUTO: 10.7 % (ref 5–12)
NEUTROPHILS # BLD AUTO: 2.54 10*3/MM3 (ref 1.7–7)
NEUTROPHILS NFR BLD AUTO: 46.7 % (ref 42.7–76)
NITRITE UR QL STRIP: NEGATIVE
NRBC BLD AUTO-RTO: 0 /100 WBC (ref 0–0.2)
PH UR STRIP: 7.5 [PH] (ref 5–8)
PLATELET # BLD AUTO: 211 10*3/MM3 (ref 140–450)
POTASSIUM SERPL-SCNC: 4 MMOL/L (ref 3.5–5.2)
PROT SERPL-MCNC: 7 G/DL (ref 6–8.5)
PROT UR QL STRIP: NEGATIVE
PSA SERPL-MCNC: 1.18 NG/ML (ref 0–4)
RBC # BLD AUTO: 5.14 10*6/MM3 (ref 4.14–5.8)
SODIUM SERPL-SCNC: 139 MMOL/L (ref 136–145)
SP GR UR: 1.01 (ref 1–1.03)
TRIGL SERPL-MCNC: 137 MG/DL (ref 0–150)
TSH SERPL DL<=0.005 MIU/L-ACNC: 2.51 UIU/ML (ref 0.27–4.2)
UROBILINOGEN UR STRIP-MCNC: NORMAL MG/DL
VLDLC SERPL CALC-MCNC: 27.4 MG/DL
WBC # BLD AUTO: 5.43 10*3/MM3 (ref 3.4–10.8)

## 2020-03-05 ENCOUNTER — OFFICE VISIT (OUTPATIENT)
Dept: INTERNAL MEDICINE | Age: 55
End: 2020-03-05

## 2020-03-05 VITALS
TEMPERATURE: 97.9 F | OXYGEN SATURATION: 98 % | SYSTOLIC BLOOD PRESSURE: 103 MMHG | DIASTOLIC BLOOD PRESSURE: 70 MMHG | HEIGHT: 72 IN | WEIGHT: 193.8 LBS | BODY MASS INDEX: 26.25 KG/M2 | HEART RATE: 73 BPM

## 2020-03-05 DIAGNOSIS — E78.5 HYPERLIPIDEMIA, UNSPECIFIED HYPERLIPIDEMIA TYPE: Chronic | ICD-10-CM

## 2020-03-05 DIAGNOSIS — Z00.00 ANNUAL PHYSICAL EXAM: Primary | ICD-10-CM

## 2020-03-05 PROCEDURE — 99396 PREV VISIT EST AGE 40-64: CPT | Performed by: NURSE PRACTITIONER

## 2020-03-05 NOTE — PATIENT INSTRUCTIONS
Health Maintenance, Male  Adopting a healthy lifestyle and getting preventive care are important in promoting health and wellness. Ask your health care provider about:  · The right schedule for you to have regular tests and exams.  · Things you can do on your own to prevent diseases and keep yourself healthy.  What should I know about diet, weight, and exercise?  Eat a healthy diet    · Eat a diet that includes plenty of vegetables, fruits, low-fat dairy products, and lean protein.  · Do not eat a lot of foods that are high in solid fats, added sugars, or sodium.  Maintain a healthy weight  Body mass index (BMI) is a measurement that can be used to identify possible weight problems. It estimates body fat based on height and weight. Your health care provider can help determine your BMI and help you achieve or maintain a healthy weight.  Get regular exercise  Get regular exercise. This is one of the most important things you can do for your health. Most adults should:  · Exercise for at least 150 minutes each week. The exercise should increase your heart rate and make you sweat (moderate-intensity exercise).  · Do strengthening exercises at least twice a week. This is in addition to the moderate-intensity exercise.  · Spend less time sitting. Even light physical activity can be beneficial.  Watch cholesterol and blood lipids  Have your blood tested for lipids and cholesterol at 20 years of age, then have this test every 5 years.  You may need to have your cholesterol levels checked more often if:  · Your lipid or cholesterol levels are high.  · You are older than 40 years of age.  · You are at high risk for heart disease.  What should I know about cancer screening?  Many types of cancers can be detected early and may often be prevented. Depending on your health history and family history, you may need to have cancer screening at various ages. This may include screening for:  · Colorectal cancer.  · Prostate  cancer.  · Skin cancer.  · Lung cancer.  What should I know about heart disease, diabetes, and high blood pressure?  Blood pressure and heart disease  · High blood pressure causes heart disease and increases the risk of stroke. This is more likely to develop in people who have high blood pressure readings, are of  descent, or are overweight.  · Talk with your health care provider about your target blood pressure readings.  · Have your blood pressure checked:  ? Every 3-5 years if you are 18-39 years of age.  ? Every year if you are 40 years old or older.  · If you are between the ages of 65 and 75 and are a current or former smoker, ask your health care provider if you should have a one-time screening for abdominal aortic aneurysm (AAA).  Diabetes  Have regular diabetes screenings. This checks your fasting blood sugar level. Have the screening done:  · Once every three years after age 45 if you are at a normal weight and have a low risk for diabetes.  · More often and at a younger age if you are overweight or have a high risk for diabetes.  What should I know about preventing infection?  Hepatitis B  If you have a higher risk for hepatitis B, you should be screened for this virus. Talk with your health care provider to find out if you are at risk for hepatitis B infection.  Hepatitis C  Blood testing is recommended for:  · Everyone born from 1945 through 1965.  · Anyone with known risk factors for hepatitis C.  Sexually transmitted infections (STIs)  · You should be screened each year for STIs, including gonorrhea and chlamydia, if:  ? You are sexually active and are younger than 24 years of age.  ? You are older than 24 years of age and your health care provider tells you that you are at risk for this type of infection.  ? Your sexual activity has changed since you were last screened, and you are at increased risk for chlamydia or gonorrhea. Ask your health care provider if you are at risk.  · Ask your  health care provider about whether you are at high risk for HIV. Your health care provider may recommend a prescription medicine to help prevent HIV infection. If you choose to take medicine to prevent HIV, you should first get tested for HIV. You should then be tested every 3 months for as long as you are taking the medicine.  Follow these instructions at home:  Lifestyle  · Do not use any products that contain nicotine or tobacco, such as cigarettes, e-cigarettes, and chewing tobacco. If you need help quitting, ask your health care provider.  · Do not use street drugs.  · Do not share needles.  · Ask your health care provider for help if you need support or information about quitting drugs.  Alcohol use  · Do not drink alcohol if your health care provider tells you not to drink.  · If you drink alcohol:  ? Limit how much you have to 0-2 drinks a day.  ? Be aware of how much alcohol is in your drink. In the [place] one drink equals one 12 oz bottle of beer (355 mL), one 5 oz glass of wine (148 mL), or one 1½ oz glass of hard liquor (44 mL).  General instructions  · Schedule regular health, dental, and eye exams.  · Stay current with your vaccines.  · Tell your health care provider if:  ? You often feel depressed.  ? You have ever been abused or do not feel safe at home.  Summary  · Adopting a healthy lifestyle and getting preventive care are important in promoting health and wellness.  · Follow your health care provider's instructions about healthy diet, exercising, and getting tested or screened for diseases.  · Follow your health care provider's instructions on monitoring your cholesterol and blood pressure.  This information is not intended to replace advice given to you by your health care provider. Make sure you discuss any questions you have with your health care provider.  Document Released: 06/15/2009 Document Revised: 12/11/2019 Document Reviewed: 12/11/2019  [employer] Interactive Patient Education © 2020  [employer]

## 2020-03-05 NOTE — PROGRESS NOTES
[facility] [place] INTERNAL MEDICINE  MARY ANN PIPER      Brendan Vickers / 54 y.o. / male  03/05/2020    CC: Annual Exam (CPE )      HPI:      Brendan presents for annual health maintenance visit.    · Last health maintenance visit: approximately 1 year ago  · General health: good  · Lifestyle:  · Attempting to lose weight?: Yes   · Diet: good  · Exercise: exercises 3-4 days/week  · Tobacco: Never used   · Alcohol: does not drink  · Work: Full-time    · Reproductive health:  · Sexually active?: Yes   · Concern for STD?: No   · Sexual problems?: No problems   · Sees Urologist?: No   · Depression Screening:      PHQ-2/PHQ-9 Depression Screening 11/5/2018   Little interest or pleasure in doing things 0   Feeling down, depressed, or hopeless 0   Total Score 0         PHQ-2: 0 (Not depressed)     PHQ-9: 0 (Negative screening for depression)    Patient Care Team:  Mary Ann Chapman APRN as PCP - General (Internal Medicine)  Amira Wu MD as Consulting Physician (Infectious Diseases)  Susan Trinidad MD as Consulting Physician (Dermatology)  MonikChristophe diane MD as Consulting Physician (Gastroenterology)  ______________________________________________________________________    ALLERGIES  No Known Allergies     MEDICATIONS  Current Outpatient Medications on File Prior to Visit   Medication Sig   • atorvastatin (LIPITOR) 40 MG tablet Take 40 mg by mouth Every Other Day.   • BIKTARVY -25 MG per tablet Take 1 tablet by mouth Every Night.   • fluticasone (FLONASE) 50 MCG/ACT nasal spray 2 sprays into the nostril(s) as directed by provider Daily As Needed for Rhinitis.   • Multiple Vitamins-Minerals (MULTIVITAMIN PO) Take 1 tablet by mouth Daily. PT HOLDING FOR SURGERY   • sildenafil (VIAGRA) 100 MG tablet Take 100 mg by mouth Daily As Needed for erectile dysfunction. TO HOLD 3 TO 5 DAYS BEFORE SURGERY   • [DISCONTINUED] benzonatate (TESSALON) 200 MG capsule Take 1 capsule by mouth 3 (Three) Times a Day As Needed  for Cough.     No current facility-administered medications on file prior to visit.        PFSH:     The following portions of the patient's history were reviewed and updated as appropriate: Allergies / Current Medications / Past Medical History / Surgical History / Social History / Family History    PROBLEM LIST   Patient Active Problem List   Diagnosis   • Lumbar radiculopathy, right   • HIV infection (CMS/HCC)   • Hyperlipidemia   • Adenomatous polyp of transverse colon   • Soft tissue neoplasm       PAST MEDICAL HISTORY  Past Medical History:   Diagnosis Date   • Colon polyp 06/04/2018    Transverse Colon Polyp x 2: Fragments of tubular adenoma, Repeat in 5 years, Dr. Ruggiero   • ED (erectile dysfunction)    • HIV disease (CMS/HCC)    • Hyperlipidemia    • Soft tissue neoplasm     UPPER BACK       SURGICAL HISTORY  Past Surgical History:   Procedure Laterality Date   • BACK SURGERY  2006    Dr. Celestin   • COLONOSCOPY N/A 06/04/2018    Transverse Colon Polyp x 2: Fragments of tubular adenoma, Repeat in 5 years, Dr. Ruggiero   • EXCISION MASS TRUNK N/A 11/11/2019    Procedure: Excision of soft tissue neoplasm of upper back;  Surgeon: Efe Lopez Jr., MD;  Location: [facility];  Service: General       SOCIAL HISTORY  Social History     Socioeconomic History   • Marital status: Single     Spouse name: Not on file   • Number of children: 0   • Years of education: Not on file   • Highest education level: Not on file   Occupational History   • Occupation: Banker   Tobacco Use   • Smoking status: Never Smoker   • Smokeless tobacco: Never Used   Substance and Sexual Activity   • Alcohol use: No   • Drug use: No   • Sexual activity: Yes     Partners: Male       FAMILY HISTORY  Family History   Problem Relation Age of Onset   • Diabetes Mother    • Heart attack Mother    • Heart disease Mother    • Breast cancer Mother    • Colon cancer Father    • Diabetes Maternal Grandmother    • Colon polyps Brother    •  "Malig Hyperthermia Neg Hx        IMMUNIZATION HISTORY  Immunization History   Administered Date(s) Administered   • FLUARIX/FLUZONE/AFLURIA/FLULAVAL QUAD 09/08/2019   • Flu Mist 10/11/2017   • Flu Vaccine Quad PF >36MO 10/08/2018   • Hepatitis A 04/20/2018, 10/20/2018   • Hepatitis B 11/12/2018, 12/12/2018, 05/14/2019   • Pneumococcal Conjugate 13-Valent (PCV13) 11/14/2015   • Pneumococcal Polysaccharide (PPSV23) 11/01/2016   • Shingrix 11/28/2018, 06/13/2019   • Tdap 11/05/2018       ______________________________________________________________________    REVIEW OF SYSTEMS    Review of Systems   Constitutional: Negative for activity change, appetite change, fatigue, fever and unexpected weight change.   HENT: Negative for congestion, ear pain, hearing loss, sore throat and trouble swallowing.    Eyes: Negative for pain and visual disturbance.   Respiratory: Negative for cough and shortness of breath.    Cardiovascular: Negative for chest pain and leg swelling.   Gastrointestinal: Negative for abdominal pain, blood in stool, constipation, diarrhea, nausea and vomiting.   Endocrine: Negative for polydipsia, polyphagia and polyuria.   Genitourinary: Negative for decreased urine volume, difficulty urinating, discharge, dysuria, enuresis, frequency, hematuria, penile pain, penile swelling, scrotal swelling, testicular pain and urgency.   Musculoskeletal: Negative for gait problem.   Neurological: Negative for dizziness, syncope, speech difficulty, weakness, numbness and headaches.   Psychiatric/Behavioral: Negative for confusion and sleep disturbance. The patient is not nervous/anxious.          VITALS:    Visit Vitals  /70   Pulse 73   Temp 97.9 °F (36.6 °C)   Ht 182.9 cm (72.01\")   Wt 87.9 kg (193 lb 12.8 oz)   SpO2 98%   BMI 26.28 kg/m²       BP Readings from Last 3 Encounters:   03/05/20 103/70   01/16/20 136/86   11/11/19 124/85     Wt Readings from Last 3 Encounters:   03/05/20 87.9 kg (193 lb 12.8 oz) "   01/16/20 89.2 kg (196 lb 9.6 oz)   11/11/19 87.1 kg (192 lb 2 oz)      Body mass index is 26.28 kg/m².    PHYSICAL EXAMINATION    Physical Exam   Constitutional: Vital signs are normal. He appears well-developed and well-nourished. He is cooperative. He does not appear ill. No distress.   HENT:   Head: Normocephalic and atraumatic.   Right Ear: Hearing, tympanic membrane, external ear and ear canal normal.   Left Ear: Hearing, tympanic membrane, external ear and ear canal normal.   Nose: Nose normal.   Mouth/Throat: Uvula is midline and oropharynx is clear and moist.   Eyes: Pupils are equal, round, and reactive to light. EOM and lids are normal.   Neck: Trachea normal and full passive range of motion without pain. Carotid bruit is not present. No thyroid mass and no thyromegaly present.   Cardiovascular: Normal rate, regular rhythm, S1 normal, S2 normal and normal heart sounds.   No murmur heard.  Pulmonary/Chest: Effort normal and breath sounds normal.   Abdominal: Soft. Normal appearance and bowel sounds are normal. There is no tenderness.   Genitourinary:   Genitourinary Comments:  exam deferred.    Lymphadenopathy:     He has no cervical adenopathy.     He has no axillary adenopathy.   Neurological: He is alert. He has normal strength. He is not disoriented. No cranial nerve deficit or sensory deficit.   Reflex Scores:       Bicep reflexes are 2+ on the right side and 2+ on the left side.       Brachioradialis reflexes are 2+ on the right side and 2+ on the left side.       Patellar reflexes are 2+ on the right side and 2+ on the left side.       Achilles reflexes are 2+ on the right side and 2+ on the left side.  Skin: Skin is warm, dry and intact.   Nursing note and vitals reviewed.        REVIEWED DATA    Labs:    Lab Results   Component Value Date     02/27/2020    K 4.0 02/27/2020    AST 30 02/27/2020    ALT 47 (H) 02/27/2020    BUN 11 02/27/2020    CREATININE 1.18 02/27/2020    CREATININE 1.26  11/04/2019    CREATININE 1.33 (H) 10/29/2018    EGFRIFNONA 64 02/27/2020    EGFRIFAFRI 77 02/27/2020       Lab Results   Component Value Date    GLUCOSE 166 (H) 11/04/2019    TSH 2.510 02/27/2020       Lab Results   Component Value Date    PSA 1.180 02/27/2020    PSA 1.500 11/05/2018       Lab Results   Component Value Date    LDL 25 02/27/2020    HDL 28 (L) 02/27/2020    TRIG 137 02/27/2020    CHOLHDLRATIO 2.86 02/27/2020       No components found for: [id number]    Lab Results   Component Value Date    WBC 5.43 02/27/2020    HGB 16.0 02/27/2020    MCV 91.6 02/27/2020     02/27/2020       Lab Results   Component Value Date    PROTEIN Negative 02/27/2020    GLUCOSEU Negative 02/27/2020    BLOODU Negative 02/27/2020    NITRITEU Negative 02/27/2020    LEUKOCYTESUR Negative 02/27/2020        No results found for: HEPCVIRUSABY    Imaging:           Medical Tests:       ______________________________________________________________________    ASSESSMENT & PLAN    ANNUAL WELLNESS EXAM / PHYSICAL     Other medical problems addressed today:      Summary/Discussion:       1. Annual physical exam      2. Hyperlipidemia, unspecified hyperlipidemia type  Lab Results   Component Value Date    CHLPL 80 02/27/2020    CHLPL 78 10/29/2018     Lab Results   Component Value Date    TRIG 137 02/27/2020    TRIG 168 (H) 10/29/2018     Lab Results   Component Value Date    HDL 28 (L) 02/27/2020    HDL 30 (L) 10/29/2018     Lab Results   Component Value Date    LDL 25 02/27/2020    LDL 14 10/29/2018       LDL level and total cholesterol very low. Recommend reduce dosage atorvastatin to 40mg every other day. Recheck FLP in 6 months.         Return in about 6 months (around 9/5/2020) for Lab appointment (fasting), 1 year annual physical with fasting lab 1 week prior .    Future Appointments   Date Time Provider Department Center   3/4/2021  8:00 AM [id number] None   3/11/2021  1:30 PM Mary Ann Chapman APRN [facility] None         HEALTHCARE MAINTENANCE ISSUES:    Cancer Screening:  · Colon: Initial/Next screening at age: CURRENT  · Repeat colon cancer screening: every 5 years  · Prostate: PSA checked annually but no CAIN needed currently  · Testicular: Recommended monthly self exam  · Skin: Monthly self skin examination, annual exam by health professional  · Lung:   · Other:    Screening Labs & Tests:  · Lab results reviewed & discussed with with patient or orders placed today.  · EKG:  · Vascular Screening:   · DEXA (75+ or risk factors):   · HEP C (If born 7428-6026 or risk factors): Negative screen    Immunization/Vaccinations (to be given today unless deferred by patient)  · Influenza: Patient had the flu shot this season  · Hepatitis A: Up to date  · Tetanus/Pertussis: Up to date  · Pneumovax: Up to date  · Prevnar 13: Up to date  · Shingles: Up to date  · Other:     Lifestyle Counseling:  · Lifestyle Modifications: Continue good lifestyle choices/modifications  · Safety Issues: Always wear seatbelt, Avoid texting while driving   · Use sunscreen, regular skin examination  · Recommended annual dental/vision examination.  · Emotional/Stress/Sleep: Reviewed and  given when appropriate      Health Maintenance   Topic Date Due   • ANNUAL PHYSICAL  11/06/2019   • LIPID PANEL  02/27/2021   • PNEUMOCOCCAL VACCINE (19-64 HIGHEST RISK) (3 of 3 - PPSV23) 11/01/2021   • COLONOSCOPY  06/04/2023   • TDAP/TD VACCINES (2 - Td) 11/05/2028   • PNEUMOCOCCAL VACCINE (19-64 HIGH RISK)  Completed   • HEPATITIS C SCREENING  Completed   • INFLUENZA VACCINE  Completed   • ZOSTER VACCINE  Completed         **Dragon Disclaimer:   Much of this encounter note is an electronic transcription/translation of spoken language to printed text. The electronic translation of spoken language may permit erroneous, or at times, nonsensical words or phrases to be inadvertently transcribed. Although I have reviewed the note for such errors, some may  still exist.

## 2021-03-02 DIAGNOSIS — Z12.5 SPECIAL SCREENING FOR MALIGNANT NEOPLASM OF PROSTATE: ICD-10-CM

## 2021-03-02 DIAGNOSIS — Z11.59 NEED FOR HEPATITIS C SCREENING TEST: ICD-10-CM

## 2021-03-02 DIAGNOSIS — Z00.00 ANNUAL PHYSICAL EXAM: Primary | ICD-10-CM

## 2021-03-05 LAB
ALBUMIN SERPL-MCNC: 4.2 G/DL (ref 3.5–5.2)
ALBUMIN/GLOB SERPL: 1.8 G/DL
ALP SERPL-CCNC: 69 U/L (ref 39–117)
ALT SERPL-CCNC: 39 U/L (ref 1–41)
APPEARANCE UR: CLEAR
AST SERPL-CCNC: 29 U/L (ref 1–40)
BASOPHILS # BLD AUTO: 0.02 10*3/MM3 (ref 0–0.2)
BASOPHILS NFR BLD AUTO: 0.4 % (ref 0–1.5)
BILIRUB SERPL-MCNC: 0.3 MG/DL (ref 0–1.2)
BILIRUB UR QL STRIP: NEGATIVE
BUN SERPL-MCNC: 8 MG/DL (ref 6–20)
BUN/CREAT SERPL: 6 (ref 7–25)
CALCIUM SERPL-MCNC: 9.1 MG/DL (ref 8.6–10.5)
CHLORIDE SERPL-SCNC: 104 MMOL/L (ref 98–107)
CHOLEST SERPL-MCNC: 90 MG/DL (ref 0–200)
CHOLEST/HDLC SERPL: 3.1 {RATIO}
CO2 SERPL-SCNC: 27 MMOL/L (ref 22–29)
COLOR UR: YELLOW
CREAT SERPL-MCNC: 1.33 MG/DL (ref 0.76–1.27)
EOSINOPHIL # BLD AUTO: 0.14 10*3/MM3 (ref 0–0.4)
EOSINOPHIL NFR BLD AUTO: 2.7 % (ref 0.3–6.2)
ERYTHROCYTE [DISTWIDTH] IN BLOOD BY AUTOMATED COUNT: 12.9 % (ref 12.3–15.4)
GLOBULIN SER CALC-MCNC: 2.4 GM/DL
GLUCOSE SERPL-MCNC: 90 MG/DL (ref 65–99)
GLUCOSE UR QL: NEGATIVE
HCT VFR BLD AUTO: 48.9 % (ref 37.5–51)
HCV AB S/CO SERPL IA: <0.1 S/CO RATIO (ref 0–0.9)
HDLC SERPL-MCNC: 29 MG/DL (ref 40–60)
HGB BLD-MCNC: 17.4 G/DL (ref 13–17.7)
HGB UR QL STRIP: NEGATIVE
IMM GRANULOCYTES # BLD AUTO: 0.01 10*3/MM3 (ref 0–0.05)
IMM GRANULOCYTES NFR BLD AUTO: 0.2 % (ref 0–0.5)
KETONES UR QL STRIP: NEGATIVE
LDLC SERPL CALC-MCNC: 31 MG/DL (ref 0–100)
LEUKOCYTE ESTERASE UR QL STRIP: NEGATIVE
LYMPHOCYTES # BLD AUTO: 2.44 10*3/MM3 (ref 0.7–3.1)
LYMPHOCYTES NFR BLD AUTO: 46.2 % (ref 19.6–45.3)
MCH RBC QN AUTO: 32.4 PG (ref 26.6–33)
MCHC RBC AUTO-ENTMCNC: 35.6 G/DL (ref 31.5–35.7)
MCV RBC AUTO: 91.1 FL (ref 79–97)
MONOCYTES # BLD AUTO: 0.52 10*3/MM3 (ref 0.1–0.9)
MONOCYTES NFR BLD AUTO: 9.8 % (ref 5–12)
NEUTROPHILS # BLD AUTO: 2.15 10*3/MM3 (ref 1.7–7)
NEUTROPHILS NFR BLD AUTO: 40.7 % (ref 42.7–76)
NITRITE UR QL STRIP: NEGATIVE
NRBC BLD AUTO-RTO: 0 /100 WBC (ref 0–0.2)
PH UR STRIP: 6.5 [PH] (ref 5–8)
PLATELET # BLD AUTO: 205 10*3/MM3 (ref 140–450)
POTASSIUM SERPL-SCNC: 4.1 MMOL/L (ref 3.5–5.2)
PROT SERPL-MCNC: 6.6 G/DL (ref 6–8.5)
PROT UR QL STRIP: NEGATIVE
PSA SERPL-MCNC: 1.1 NG/ML (ref 0–4)
RBC # BLD AUTO: 5.37 10*6/MM3 (ref 4.14–5.8)
SODIUM SERPL-SCNC: 140 MMOL/L (ref 136–145)
SP GR UR: 1.02 (ref 1–1.03)
TRIGL SERPL-MCNC: 188 MG/DL (ref 0–150)
TSH SERPL DL<=0.005 MIU/L-ACNC: 2.94 UIU/ML (ref 0.27–4.2)
UROBILINOGEN UR STRIP-MCNC: NORMAL MG/DL
VLDLC SERPL CALC-MCNC: 30 MG/DL (ref 5–40)
WBC # BLD AUTO: 5.28 10*3/MM3 (ref 3.4–10.8)

## 2021-03-11 ENCOUNTER — OFFICE VISIT (OUTPATIENT)
Dept: INTERNAL MEDICINE | Age: 56
End: 2021-03-11

## 2021-03-11 VITALS
OXYGEN SATURATION: 98 % | SYSTOLIC BLOOD PRESSURE: 130 MMHG | HEIGHT: 72 IN | HEART RATE: 81 BPM | DIASTOLIC BLOOD PRESSURE: 80 MMHG | WEIGHT: 202 LBS | TEMPERATURE: 97.7 F | BODY MASS INDEX: 27.36 KG/M2

## 2021-03-11 DIAGNOSIS — E78.5 HYPERLIPIDEMIA, UNSPECIFIED HYPERLIPIDEMIA TYPE: Chronic | ICD-10-CM

## 2021-03-11 DIAGNOSIS — Z00.00 ANNUAL PHYSICAL EXAM: Primary | ICD-10-CM

## 2021-03-11 PROCEDURE — 99396 PREV VISIT EST AGE 40-64: CPT | Performed by: NURSE PRACTITIONER

## 2021-03-11 RX ORDER — ATORVASTATIN CALCIUM 40 MG/1
20 TABLET, FILM COATED ORAL EVERY OTHER DAY
Qty: 90 TABLET | Refills: 0 | Status: SHIPPED | OUTPATIENT
Start: 2021-03-11 | End: 2023-01-11 | Stop reason: SDUPTHER

## 2021-03-11 NOTE — PROGRESS NOTES
[facility] [place]INE  MARY ANN PIPER      Brendan Vickers / 55 y.o. / male  03/11/2021    CC: Annual Exam      HPI:      Brendan presents for annual health maintenance visit.    · Last health maintenance visit: approximately 1 year ago  · General health: good  · Lifestyle:  · Attempting to lose weight?: No, weight up approximately 8 lbs since 1 year ago   · Diet: well balanced   · Exercise: has not been as active recently, tries to swim on a regular basis   · Tobacco: Never used   · Alcohol: does not drink  · Work: Full-time  · Reproductive health:  · Sexually active?: Yes   · Concern for STD?: No   · Sexual problems?: No problems   · Sees Urologist?: No   · Depression Screening:      PHQ-2/PHQ-9 Depression Screening 3/11/2021   Little interest or pleasure in doing things 0   Feeling down, depressed, or hopeless 0   Total Score 0         PHQ-2: 0 (Not depressed)     PHQ-9: 0 (Negative screening for depression)    Patient Care Team:  Mary Ann Chapman APRN as PCP - General (Internal Medicine)  Amira Wu MD as Consulting Physician (Infectious Diseases)  Susan Trinidad MD as Consulting Physician (Dermatology)  Monik, Christophe Clifton MD as Consulting Physician (Gastroenterology)  ______________________________________________________________________    ALLERGIES  No Known Allergies     MEDICATIONS  Current Outpatient Medications on File Prior to Visit   Medication Sig   • BIKTARVY -25 MG per tablet Take 1 tablet by mouth Every Night.   • fluticasone (FLONASE) 50 MCG/ACT nasal spray 2 sprays into the nostril(s) as directed by provider Daily As Needed for Rhinitis.   • Multiple Vitamins-Minerals (MULTIVITAMIN PO) Take 1 tablet by mouth Daily. PT HOLDING FOR SURGERY   • sildenafil (VIAGRA) 100 MG tablet Take 100 mg by mouth Daily As Needed for erectile dysfunction. TO HOLD 3 TO 5 DAYS BEFORE SURGERY   • [DISCONTINUED] atorvastatin (LIPITOR) 40 MG tablet Take 40 mg by mouth Every Other Day.     No  current facility-administered medications on file prior to visit.       PFSH:     The following portions of the patient's history were reviewed and updated as appropriate: Allergies / Current Medications / Past Medical History / Surgical History / Social History / Family History    PROBLEM LIST   Patient Active Problem List   Diagnosis   • Lumbar radiculopathy, right   • HIV infection (CMS/HCC)   • Hyperlipidemia   • Adenomatous polyp of transverse colon   • Soft tissue neoplasm       PAST MEDICAL HISTORY  Past Medical History:   Diagnosis Date   • Colon polyp 06/04/2018    Transverse Colon Polyp x 2: Fragments of tubular adenoma, Repeat in 5 years, Dr. Ruggiero   • ED (erectile dysfunction)    • HIV disease (CMS/HCC)    • Hyperlipidemia    • Soft tissue neoplasm     UPPER BACK       SURGICAL HISTORY  Past Surgical History:   Procedure Laterality Date   • BACK SURGERY  2006    Dr. Celestin   • COLONOSCOPY N/A 06/04/2018    Transverse Colon Polyp x 2: Fragments of tubular adenoma, Repeat in 5 years, Dr. Ruggiero   • EXCISION MASS TRUNK N/A 11/11/2019    Procedure: Excision of soft tissue neoplasm of upper back;  Surgeon: Efe Lopez Jr., MD;  Location: [facility];  Service: General       SOCIAL HISTORY  Social History     Socioeconomic History   • Marital status: Single     Spouse name: Not on file   • Number of children: 0   • Years of education: Not on file   • Highest education level: Not on file   Tobacco Use   • Smoking status: Never Smoker   • Smokeless tobacco: Never Used   Substance and Sexual Activity   • Alcohol use: No   • Drug use: No   • Sexual activity: Yes     Partners: Male       FAMILY HISTORY  Family History   Problem Relation Age of Onset   • Diabetes Mother    • Heart attack Mother    • Heart disease Mother    • Breast cancer Mother    • Colon cancer Father    • Diabetes Maternal Grandmother    • Colon polyps Brother    • Malig Hyperthermia Neg Hx        IMMUNIZATION  "HISTORY  Immunization History   Administered Date(s) Administered   • COVID-19 (PFIZER) 02/27/2021   • Flu Mist 10/11/2017   • Flu Vaccine Quad PF >36MO 10/08/2018, 09/22/2020   • Flulaval/Fluarix/Fluzone Quad 10/08/2018, 09/08/2019   • Hepatitis A 04/20/2018, 10/20/2018   • Hepatitis B 11/12/2018, 12/12/2018, 05/14/2019   • Pneumococcal Conjugate 13-Valent (PCV13) 11/14/2015   • Pneumococcal Polysaccharide (PPSV23) 11/01/2016   • Shingrix 11/28/2018, 06/13/2019   • Tdap 11/05/2018       ______________________________________________________________________    REVIEW OF SYSTEMS    Review of Systems   Constitutional: Negative for activity change, appetite change, fatigue, fever and unexpected weight change.   HENT: Negative for congestion, ear pain, hearing loss, sore throat and trouble swallowing.    Eyes: Negative for pain and visual disturbance.   Respiratory: Negative for cough and shortness of breath.    Cardiovascular: Negative for chest pain and leg swelling.   Gastrointestinal: Negative for abdominal pain, blood in stool, constipation, diarrhea, nausea and vomiting.   Endocrine: Negative for polydipsia, polyphagia and polyuria.   Genitourinary: Negative for decreased urine volume, difficulty urinating, discharge, dysuria, enuresis, frequency, hematuria, penile pain, penile swelling, scrotal swelling, testicular pain and urgency.   Musculoskeletal: Negative for gait problem.   Neurological: Negative for dizziness, syncope, speech difficulty, weakness, numbness and headaches.   Psychiatric/Behavioral: Negative for confusion and sleep disturbance. The patient is not nervous/anxious.          VITALS:    Visit Vitals  /80   Pulse 81   Temp 97.7 °F (36.5 °C) (Temporal)   Ht 182.9 cm (72\")   Wt 91.6 kg (202 lb)   SpO2 98%   BMI 27.40 kg/m²       BP Readings from Last 3 Encounters:   03/11/21 130/80   03/05/20 103/70   01/16/20 136/86     Wt Readings from Last 3 Encounters:   03/11/21 91.6 kg (202 lb) "   03/05/20 87.9 kg (193 lb 12.8 oz)   01/16/20 89.2 kg (196 lb 9.6 oz)      Body mass index is 27.4 kg/m².    PHYSICAL EXAMINATION    Physical Exam  Vitals and nursing note reviewed.   Constitutional:       General: He is not in acute distress.     Appearance: Normal appearance. He is well-developed. He is not ill-appearing.   HENT:      Head: Normocephalic and atraumatic.      Right Ear: Hearing, tympanic membrane, ear canal and external ear normal.      Left Ear: Hearing, tympanic membrane, ear canal and external ear normal.      Mouth/Throat:      Comments: Oral exam deferred due to COVID-19 pandemic and mask policy due to increased risk of aerosolized infection   Reports dental exam UTD. No concerns.   Eyes:      General: Lids are normal.      Pupils: Pupils are equal, round, and reactive to light.   Neck:      Thyroid: No thyroid mass or thyromegaly.      Vascular: No carotid bruit.      Trachea: Trachea normal.   Cardiovascular:      Rate and Rhythm: Normal rate and regular rhythm.      Heart sounds: Normal heart sounds, S1 normal and S2 normal. No murmur.   Pulmonary:      Effort: Pulmonary effort is normal.      Breath sounds: Normal breath sounds.   Abdominal:      General: Bowel sounds are normal.      Palpations: Abdomen is soft.      Tenderness: There is no abdominal tenderness.   Musculoskeletal:      Cervical back: Full passive range of motion without pain.   Lymphadenopathy:      Cervical: No cervical adenopathy.   Skin:     General: Skin is warm and dry.   Neurological:      Mental Status: He is alert. He is not disoriented.      Cranial Nerves: No cranial nerve deficit.      Sensory: No sensory deficit.   Psychiatric:         Behavior: Behavior is cooperative.           REVIEWED DATA    Labs:    Lab Results   Component Value Date     03/04/2021    K 4.1 03/04/2021    AST 29 03/04/2021    ALT 39 03/04/2021    BUN 8 03/04/2021    CREATININE 1.33 (H) 03/04/2021    CREATININE 1.18 02/27/2020     CREATININE 1.26 11/04/2019    EGFRIFNONA 55 (L) 03/04/2021    EGFRIFAFRI 67 03/04/2021       Lab Results   Component Value Date    GLUCOSE 166 (H) 11/04/2019    TSH 2.940 03/04/2021       Lab Results   Component Value Date    PSA 1.100 03/04/2021    PSA 1.180 02/27/2020    PSA 1.500 11/05/2018       Lab Results   Component Value Date    LDL 31 03/04/2021    HDL 29 (L) 03/04/2021    TRIG 188 (H) 03/04/2021    CHOLHDLRATIO 3.10 03/04/2021       No components found for: [id number]    Lab Results   Component Value Date    WBC 5.28 03/04/2021    HGB 17.4 03/04/2021    MCV 91.1 03/04/2021     03/04/2021       Lab Results   Component Value Date    PROTEIN Negative 03/04/2021    GLUCOSEU Negative 03/04/2021    BLOODU Negative 03/04/2021    NITRITEU Negative 03/04/2021    LEUKOCYTESUR Negative 03/04/2021          Lab Results   Component Value Date    HEPCVIRUSABY <0.1 03/04/2021       Imaging:           Medical Tests:       ______________________________________________________________________    ASSESSMENT & PLAN    ANNUAL WELLNESS EXAM / PHYSICAL     Other medical problems addressed today:      Summary/Discussion:       1. Annual physical exam      2. Hyperlipidemia, unspecified hyperlipidemia type  Patient's LDL is very low.  Recommend decrease dosage to half a tablet every other day.  If labs remain similar in 1 year, will discontinue medication completely.    - atorvastatin (LIPITOR) 40 MG tablet; Take 0.5 tablets by mouth Every Other Day.  Dispense: 90 tablet; Refill: 0        Return in about 1 year (around 3/11/2022) for Next scheduled follow up with fasting labs 1 week prior .    Future Appointments   Date Time Provider Department Center   7/12/2021  1:20 PM Franklyn Farr MD [facility] None         HEALTHCARE MAINTENANCE ISSUES:    Cancer Screening:  · Colon: Initial/Next screening at age: CURRENT  · Repeat colon cancer screening: every 5 years  · Prostate: PSA checked annually but no CAIN needed  currently  · Testicular: Recommended monthly self exam  · Skin: Monthly self skin examination, annual exam by health professional  · Lung:   · Other:    Screening Labs & Tests:  · Lab results reviewed & discussed with with patient or orders placed today.  · EKG:  · Vascular Screening:   · DEXA (75+ or risk factors):   · HEP C (If born 2631-0759 or risk factors): Negative screen    Immunization/Vaccinations (to be given today unless deferred by patient)  · Influenza: Patient had the flu shot this season  · Hepatitis A: Up to date  · Tetanus/Pertussis: Up to date  · Pneumovax: Up to date  · Prevnar 13: Up to date  · Shingles: Up to date  · Other:     Lifestyle Counseling:  · Lifestyle Modifications: Attempt to lose weight and Improve dietary compliance  · Safety Issues: Always wear seatbelt, Avoid texting while driving   · Use sunscreen, regular skin examination  · Recommended annual dental/vision examination.  · Emotional/Stress/Sleep: Reviewed and  given when appropriate      Health Maintenance   Topic Date Due   • MENINGOCOCCAL VACCINE (1 - Risk start before 7 months 4-dose series) Never done   • COVID-19 Vaccine (2 of 2 - Pfizer series) 03/20/2021   • Pneumococcal Vaccine 0-64 (3 of 3 - PPSV23) 11/01/2021   • LIPID PANEL  03/04/2022   • ANNUAL PHYSICAL  03/12/2022   • COLONOSCOPY  06/04/2023   • TDAP/TD VACCINES (2 - Td) 11/05/2028   • HEPATITIS C SCREENING  Completed   • Hepatitis B  Completed   • INFLUENZA VACCINE  Completed   • ZOSTER VACCINE  Completed         **Dragon Disclaimer:   Much of this encounter note is an electronic transcription/translation of spoken language to printed text. The electronic translation of spoken language may permit erroneous, or at times, nonsensical words or phrases to be inadvertently transcribed. Although I have reviewed the note for such errors, some may still exist.

## 2021-07-12 ENCOUNTER — OFFICE VISIT (OUTPATIENT)
Dept: INFECTIOUS DISEASES | Facility: CLINIC | Age: 56
End: 2021-07-12

## 2021-07-12 ENCOUNTER — LAB (OUTPATIENT)
Dept: LAB | Facility: HOSPITAL | Age: 56
End: 2021-07-12

## 2021-07-12 VITALS
TEMPERATURE: 97.1 F | HEART RATE: 82 BPM | WEIGHT: 197.4 LBS | HEIGHT: 72 IN | BODY MASS INDEX: 26.74 KG/M2 | DIASTOLIC BLOOD PRESSURE: 83 MMHG | SYSTOLIC BLOOD PRESSURE: 129 MMHG

## 2021-07-12 DIAGNOSIS — Z21 ASYMPTOMATIC HIV INFECTION, WITH NO HISTORY OF HIV-RELATED ILLNESS (HCC): Primary | ICD-10-CM

## 2021-07-12 DIAGNOSIS — Z79.2 LONG TERM (CURRENT) USE OF ANTIBIOTICS: ICD-10-CM

## 2021-07-12 LAB
ALBUMIN SERPL-MCNC: 4.3 G/DL (ref 3.5–5.2)
ALBUMIN/GLOB SERPL: 1.5 G/DL
ALP SERPL-CCNC: 67 U/L (ref 39–117)
ALT SERPL W P-5'-P-CCNC: 34 U/L (ref 1–41)
ANION GAP SERPL CALCULATED.3IONS-SCNC: 9.6 MMOL/L (ref 5–15)
AST SERPL-CCNC: 29 U/L (ref 1–40)
BASOPHILS # BLD AUTO: 0.03 10*3/MM3 (ref 0–0.2)
BASOPHILS NFR BLD AUTO: 0.5 % (ref 0–1.5)
BILIRUB SERPL-MCNC: 0.5 MG/DL (ref 0–1.2)
BUN SERPL-MCNC: 12 MG/DL (ref 6–20)
BUN/CREAT SERPL: 9.5 (ref 7–25)
CALCIUM SPEC-SCNC: 9.4 MG/DL (ref 8.6–10.5)
CHLORIDE SERPL-SCNC: 103 MMOL/L (ref 98–107)
CO2 SERPL-SCNC: 26.4 MMOL/L (ref 22–29)
CREAT SERPL-MCNC: 1.26 MG/DL (ref 0.76–1.27)
DEPRECATED RDW RBC AUTO: 47.6 FL (ref 37–54)
EOSINOPHIL # BLD AUTO: 0.06 10*3/MM3 (ref 0–0.4)
EOSINOPHIL NFR BLD AUTO: 0.9 % (ref 0.3–6.2)
ERYTHROCYTE [DISTWIDTH] IN BLOOD BY AUTOMATED COUNT: 14.1 % (ref 12.3–15.4)
GFR SERPL CREATININE-BSD FRML MDRD: 59 ML/MIN/1.73
GLOBULIN UR ELPH-MCNC: 2.9 GM/DL
GLUCOSE SERPL-MCNC: 98 MG/DL (ref 65–99)
HCT VFR BLD AUTO: 48.4 % (ref 37.5–51)
HCV AB SER DONR QL: NORMAL
HGB BLD-MCNC: 16.9 G/DL (ref 13–17.7)
IMM GRANULOCYTES # BLD AUTO: 0.02 10*3/MM3 (ref 0–0.05)
IMM GRANULOCYTES NFR BLD AUTO: 0.3 % (ref 0–0.5)
LYMPHOCYTES # BLD AUTO: 2.09 10*3/MM3 (ref 0.7–3.1)
LYMPHOCYTES NFR BLD AUTO: 31.6 % (ref 19.6–45.3)
MCH RBC QN AUTO: 32.1 PG (ref 26.6–33)
MCHC RBC AUTO-ENTMCNC: 34.9 G/DL (ref 31.5–35.7)
MCV RBC AUTO: 91.8 FL (ref 79–97)
MONOCYTES # BLD AUTO: 0.54 10*3/MM3 (ref 0.1–0.9)
MONOCYTES NFR BLD AUTO: 8.2 % (ref 5–12)
NEUTROPHILS NFR BLD AUTO: 3.88 10*3/MM3 (ref 1.7–7)
NEUTROPHILS NFR BLD AUTO: 58.5 % (ref 42.7–76)
NRBC BLD AUTO-RTO: 0 /100 WBC (ref 0–0.2)
PLATELET # BLD AUTO: 212 10*3/MM3 (ref 140–450)
PMV BLD AUTO: 10.6 FL (ref 6–12)
POTASSIUM SERPL-SCNC: 4.2 MMOL/L (ref 3.5–5.2)
PROT SERPL-MCNC: 7.2 G/DL (ref 6–8.5)
RBC # BLD AUTO: 5.27 10*6/MM3 (ref 4.14–5.8)
RPR SER QL: NORMAL
SODIUM SERPL-SCNC: 139 MMOL/L (ref 136–145)
WBC # BLD AUTO: 6.62 10*3/MM3 (ref 3.4–10.8)

## 2021-07-12 PROCEDURE — 86707 HEPATITIS BE ANTIBODY: CPT | Performed by: INTERNAL MEDICINE

## 2021-07-12 PROCEDURE — 86592 SYPHILIS TEST NON-TREP QUAL: CPT | Performed by: INTERNAL MEDICINE

## 2021-07-12 PROCEDURE — 87350 HEPATITIS BE AG IA: CPT | Performed by: INTERNAL MEDICINE

## 2021-07-12 PROCEDURE — 80053 COMPREHEN METABOLIC PANEL: CPT | Performed by: INTERNAL MEDICINE

## 2021-07-12 PROCEDURE — 36415 COLL VENOUS BLD VENIPUNCTURE: CPT | Performed by: INTERNAL MEDICINE

## 2021-07-12 PROCEDURE — 80074 ACUTE HEPATITIS PANEL: CPT | Performed by: INTERNAL MEDICINE

## 2021-07-12 PROCEDURE — 86706 HEP B SURFACE ANTIBODY: CPT | Performed by: INTERNAL MEDICINE

## 2021-07-12 PROCEDURE — 99204 OFFICE O/P NEW MOD 45 MIN: CPT | Performed by: INTERNAL MEDICINE

## 2021-07-12 PROCEDURE — 86361 T CELL ABSOLUTE COUNT: CPT | Performed by: INTERNAL MEDICINE

## 2021-07-12 PROCEDURE — 87491 CHLMYD TRACH DNA AMP PROBE: CPT | Performed by: INTERNAL MEDICINE

## 2021-07-12 PROCEDURE — 87536 HIV-1 QUANT&REVRSE TRNSCRPJ: CPT | Performed by: INTERNAL MEDICINE

## 2021-07-12 PROCEDURE — 86704 HEP B CORE ANTIBODY TOTAL: CPT | Performed by: INTERNAL MEDICINE

## 2021-07-12 PROCEDURE — 85025 COMPLETE CBC W/AUTO DIFF WBC: CPT | Performed by: INTERNAL MEDICINE

## 2021-07-12 PROCEDURE — 87591 N.GONORRHOEAE DNA AMP PROB: CPT | Performed by: INTERNAL MEDICINE

## 2021-07-12 NOTE — PROGRESS NOTES
cc: Here for HIV    This very nice 55-year-old gentleman we are asked to provide evaluation regarding HIV.    Patient reports that he was in his usual state of health until approximately 2007 when he he became ill.  He actually required hospitalization at things and around January 2008 for pneumocystis pneumonia at which time he was diagnosed with HIV.  He had very low T-cell counts.  He thinks he was started on Combivir and boosted Reyataz but was quickly switched to a Atripla.  He had vivid dreams with a Atripla and was switched to Biktarvy around 2018 or 2019.  Has done really well with the Biktarvy.  No side effects or missed doses.  Remains asymptomatic from HIV he has been undetectable for years.  His CD4 count at last check was in the 900s.  He had been following with Dr Wu, but is transferring to our clinic due to her detention.     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 No t/e/d.  He enjoys travel and swimming.  He is in a monogamous relationship and they do not use protection during sexual activity      Review of Systems: All other reviewed and negative except as per HPI    There were no vitals taken for this visit.  GENERAL: Awake and alert, in no acute distress.   HEENT: Oropharynx is clear. Hearing is grossly normal.   EYES: PERRL. No conjunctival injection. No lid lag.   LYMPHATICS: No lymphadenopathy of the neck or axillary regions.   HEART: Regular rate and rhythm. No peripheral edema.   LUNGS: Clear to auscultation anteriorly with normal respiratory effort.   ABDOMEN: Soft, nontender, nondistended. No appreciable organomegaly.   SKIN: Warm and dry without cutaneous eruptions   PSYCHIATRIC: Appropriate mood, affect, insight, and judgment.       DIAGNOSTICS:  Cr 1.33  WVC 5.28    Hgb 17    HIV RNA (GT=??? )  --    CD4  --    Hepatitis  --HAV   --HBV   --HCV     STI  --RPR   --Urine GC    Vaccines  --HAV  --HBV  --PPSV  23  --PCV13  --Tdap    Misc  --Tspot  --[id number]          Assessment and Plan  Asymptomatic HIV infection, with no history of HIV-related illness (CMS/HCC)  Long term (current) use of antibiotics    He is doing very well on Biktarvy and will cont.  Check screening labs as below.    Discussed with patient HIV diagnosis, goals of treatment, lab monitoring, means to reduce transmission, need for adherence to therapy as well as treatment plan, and ways to militate against the progression of disease.  Patient understands and all questions answered.    Orders Placed This Encounter   Procedures   • Chlamydia trachomatis, Neisseria gonorrhoeae, PCR - Urine, Urine, Clean Catch     Order Specific Question:   Release to patient     Answer:   Immediate   • Hepatitis A Antibody, Total     Order Specific Question:   Release to patient     Answer:   Immediate   • Hepatitis B Virus Profile     Order Specific Question:   Release to patient     Answer:   Immediate   • Hepatitis C Antibody     Order Specific Question:   Release to patient     Answer:   Immediate   • HIV RNA Real Time PCR (Non-Graph)     Order Specific Question:   Release to patient     Answer:   Immediate   • T-helper Cells (CD4) Count     Order Specific Question:   Release to patient     Answer:   Immediate   • RPR     Order Specific Question:   Release to patient     Answer:   Immediate   • Comprehensive Metabolic Panel     Order Specific Question:   Release to patient     Answer:   Immediate   • CBC & Differential     Order Specific Question:   Manual Differential     Answer:   No

## 2021-07-13 LAB
BASOPHILS # BLD AUTO: 0 X10E3/UL (ref 0–0.2)
BASOPHILS NFR BLD AUTO: 0 %
C TRACH RRNA SPEC QL NAA+PROBE: NEGATIVE
CD3+CD4+ CELLS # BLD: 945 /UL (ref 359–1519)
CD3+CD4+ CELLS NFR BLD: 45 % (ref 30.8–58.5)
EOSINOPHIL # BLD AUTO: 0.1 X10E3/UL (ref 0–0.4)
EOSINOPHIL NFR BLD AUTO: 1 %
ERYTHROCYTE [DISTWIDTH] IN BLOOD BY AUTOMATED COUNT: 13.2 % (ref 11.6–15.4)
HAV AB SER QL IA: POSITIVE
HBV CORE AB SERPL QL IA: NEGATIVE
HBV CORE IGM SERPL QL IA: NEGATIVE
HBV E AB SERPL QL IA: NEGATIVE
HBV E AG SERPL QL IA: NEGATIVE
HBV SURFACE AB SER QL: REACTIVE
HBV SURFACE AG SERPL QL IA: NEGATIVE
HCT VFR BLD AUTO: 49.2 % (ref 37.5–51)
HGB BLD-MCNC: 17 G/DL (ref 13–17.7)
HIV1 RNA # SERPL NAA+PROBE: <20 COPIES/ML
HIV1 RNA SERPL NAA+PROBE-LOG#: NORMAL {LOG_COPIES}/ML
IMM GRANULOCYTES # BLD AUTO: 0 X10E3/UL (ref 0–0.1)
IMM GRANULOCYTES NFR BLD AUTO: 0 %
LYMPHOCYTES # BLD AUTO: 2.1 X10E3/UL (ref 0.7–3.1)
LYMPHOCYTES NFR BLD AUTO: 31 %
MCH RBC QN AUTO: 31.5 PG (ref 26.6–33)
MCHC RBC AUTO-ENTMCNC: 34.6 G/DL (ref 31.5–35.7)
MCV RBC AUTO: 91 FL (ref 79–97)
MONOCYTES # BLD AUTO: 0.6 X10E3/UL (ref 0.1–0.9)
MONOCYTES NFR BLD AUTO: 9 %
N GONORRHOEA RRNA SPEC QL NAA+PROBE: NEGATIVE
NEUTROPHILS # BLD AUTO: 4 X10E3/UL (ref 1.4–7)
NEUTROPHILS NFR BLD AUTO: 59 %
PLATELET # BLD AUTO: 221 X10E3/UL (ref 150–450)
RBC # BLD AUTO: 5.39 X10E6/UL (ref 4.14–5.8)
WBC # BLD AUTO: 6.8 X10E3/UL (ref 3.4–10.8)

## 2022-01-14 ENCOUNTER — OFFICE VISIT (OUTPATIENT)
Dept: INFECTIOUS DISEASES | Facility: CLINIC | Age: 57
End: 2022-01-14

## 2022-01-14 ENCOUNTER — LAB (OUTPATIENT)
Dept: LAB | Facility: HOSPITAL | Age: 57
End: 2022-01-14

## 2022-01-14 VITALS
DIASTOLIC BLOOD PRESSURE: 85 MMHG | SYSTOLIC BLOOD PRESSURE: 133 MMHG | TEMPERATURE: 97.9 F | BODY MASS INDEX: 27.2 KG/M2 | HEART RATE: 64 BPM | HEIGHT: 72 IN | WEIGHT: 200.8 LBS

## 2022-01-14 DIAGNOSIS — B20 CURRENTLY ASYMPTOMATIC HIV INFECTION, WITH HISTORY OF HIV-RELATED ILLNESS: Primary | ICD-10-CM

## 2022-01-14 DIAGNOSIS — Z79.2 LONG TERM (CURRENT) USE OF ANTIBIOTICS: ICD-10-CM

## 2022-01-14 DIAGNOSIS — N52.9 ERECTILE DYSFUNCTION, UNSPECIFIED ERECTILE DYSFUNCTION TYPE: ICD-10-CM

## 2022-01-14 LAB
ALBUMIN SERPL-MCNC: 4.3 G/DL (ref 3.5–5.2)
ALBUMIN/GLOB SERPL: 1.7 G/DL
ALP SERPL-CCNC: 72 U/L (ref 39–117)
ALT SERPL W P-5'-P-CCNC: 34 U/L (ref 1–41)
ANION GAP SERPL CALCULATED.3IONS-SCNC: 9.2 MMOL/L (ref 5–15)
AST SERPL-CCNC: 25 U/L (ref 1–40)
BILIRUB SERPL-MCNC: 0.5 MG/DL (ref 0–1.2)
BUN SERPL-MCNC: 12 MG/DL (ref 6–20)
BUN/CREAT SERPL: 10 (ref 7–25)
CALCIUM SPEC-SCNC: 9.2 MG/DL (ref 8.6–10.5)
CHLORIDE SERPL-SCNC: 103 MMOL/L (ref 98–107)
CO2 SERPL-SCNC: 28.8 MMOL/L (ref 22–29)
CREAT SERPL-MCNC: 1.2 MG/DL (ref 0.76–1.27)
GFR SERPL CREATININE-BSD FRML MDRD: 62 ML/MIN/1.73
GLOBULIN UR ELPH-MCNC: 2.6 GM/DL
GLUCOSE SERPL-MCNC: 54 MG/DL (ref 65–99)
POTASSIUM SERPL-SCNC: 4.1 MMOL/L (ref 3.5–5.2)
PROT SERPL-MCNC: 6.9 G/DL (ref 6–8.5)
SODIUM SERPL-SCNC: 141 MMOL/L (ref 136–145)

## 2022-01-14 PROCEDURE — 80053 COMPREHEN METABOLIC PANEL: CPT | Performed by: INTERNAL MEDICINE

## 2022-01-14 PROCEDURE — 99214 OFFICE O/P EST MOD 30 MIN: CPT | Performed by: INTERNAL MEDICINE

## 2022-01-14 PROCEDURE — 36415 COLL VENOUS BLD VENIPUNCTURE: CPT | Performed by: INTERNAL MEDICINE

## 2022-01-14 PROCEDURE — 87536 HIV-1 QUANT&REVRSE TRNSCRPJ: CPT | Performed by: INTERNAL MEDICINE

## 2022-01-14 RX ORDER — SILDENAFIL 100 MG/1
100 TABLET, FILM COATED ORAL DAILY PRN
Qty: 30 TABLET | Refills: 1 | Status: SHIPPED | OUTPATIENT
Start: 2022-01-14

## 2022-01-14 RX ORDER — BICTEGRAVIR SODIUM, EMTRICITABINE, AND TENOFOVIR ALAFENAMIDE FUMARATE 50; 200; 25 MG/1; MG/1; MG/1
1 TABLET ORAL NIGHTLY
Qty: 30 TABLET | Refills: 4 | Status: SHIPPED | OUTPATIENT
Start: 2022-01-14 | End: 2022-03-21 | Stop reason: SDUPTHER

## 2022-01-14 NOTE — PROGRESS NOTES
"cc: Here for HIV    Per initial note in July 2021: Patient reports that he was in his usual state of health until approximately 2007 when he he became ill.  He actually required hospitalization at things and around January 2008 for pneumocystis pneumonia at which time he was diagnosed with HIV.  He had very low T-cell counts.  He thinks he was started on Combivir and boosted Reyataz but was quickly switched to a Atripla.  He had vivid dreams with a Atripla and was switched to Biktarvy around 2018 or 2019.  Has done really well with the Biktarvy.  No side effects or missed doses.  Remains asymptomatic from HIV he has been undetectable for years.  His CD4 count at last check was in the 900s.  He had been following with Dr Wu, but is transferring to our clinic due to her detention.\"    At last visit in July 2021 he was undetectable.  He denies any symptoms of HIV.  No fevers or chills or night sweats.  Tolerating Biktarvy without missed doses or side effects.  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  He enjoys travel and swimming.  He is in a monogamous relationship and they do not use protection during sexual activity      Review of Systems: All other reviewed and negative except as per HPI    Blood pressure 133/85, pulse 64, temperature 97.9 °F (36.6 °C), height 182.9 cm (72.01\"), weight 91.1 kg (200 lb 12.8 oz).  GENERAL: Awake and alert, in no acute distress.   HEENT: Oropharynx is clear. Hearing is grossly normal.   SKIN: Warm and dry without cutaneous eruptions   PSYCHIATRIC: Appropriate mood, affect, insight, and judgment.       DIAGNOSTICS:  HIV RNA (GT=??? )  --7/12/21 <20    CD4  --7/12/21 945/45%    Hepatitis  --HAV 7/12/21 Ab+  --HBV 7/12/21 sAB+  --HCV NR 7/12/21    STI  --RPR NR 7/12/21  --Urine GC NR 7/12/21    Vaccines  --HAV  --HBV  --PPSV " 23  --PCV13  --Tdap    Misc  --Tspot  --[id number]          Assessment and Plan  Asymptomatic HIV infection, with history of HIV-related illness (CMS/HCC)  Long term (current) use of antibiotics    He is doing very well on Biktarvy and will cont. CMP and HIV viral load today.  He also takes sildenafil as needed for ED and have refilled this per his request.  RTC in 6 mo or sooner if needed

## 2022-01-17 LAB
HIV1 RNA # SERPL NAA+PROBE: <20 COPIES/ML
HIV1 RNA SERPL NAA+PROBE-LOG#: NORMAL {LOG_COPIES}/ML

## 2022-01-18 ENCOUNTER — TELEPHONE (OUTPATIENT)
Dept: INFECTIOUS DISEASES | Facility: CLINIC | Age: 57
End: 2022-01-18

## 2022-01-18 NOTE — TELEPHONE ENCOUNTER
Informed pt of undetectable HIV per Dr. Farr.  Pt did question his low glucose level; states he was not fasting and had a coke prior to appointment.  I inquired of pt if he had been having any symptoms of dizziness or lightheadedness, pt denies any such symptoms.  Informed pt that he should discuss with PCP at his next appointment, but to contact them should he begin having any concerning symptoms.  Patient voiced understanding.

## 2022-01-18 NOTE — TELEPHONE ENCOUNTER
----- Message from Franklyn Farr MD sent at 1/18/2022 10:40 AM EST -----  Can we let him HIV was undetectable and to keep up the good work?

## 2022-03-17 ENCOUNTER — OFFICE VISIT (OUTPATIENT)
Dept: INTERNAL MEDICINE | Facility: CLINIC | Age: 57
End: 2022-03-17

## 2022-03-17 VITALS
HEIGHT: 72 IN | BODY MASS INDEX: 26.82 KG/M2 | HEART RATE: 73 BPM | DIASTOLIC BLOOD PRESSURE: 89 MMHG | SYSTOLIC BLOOD PRESSURE: 130 MMHG | TEMPERATURE: 97.7 F | OXYGEN SATURATION: 97 % | WEIGHT: 198 LBS

## 2022-03-17 DIAGNOSIS — E78.5 HYPERLIPIDEMIA, UNSPECIFIED HYPERLIPIDEMIA TYPE: ICD-10-CM

## 2022-03-17 DIAGNOSIS — N18.2 STAGE 2 CHRONIC KIDNEY DISEASE: ICD-10-CM

## 2022-03-17 DIAGNOSIS — D22.9 ATYPICAL NEVUS: ICD-10-CM

## 2022-03-17 DIAGNOSIS — R03.0 ELEVATED BLOOD PRESSURE READING: Primary | ICD-10-CM

## 2022-03-17 PROCEDURE — 99214 OFFICE O/P EST MOD 30 MIN: CPT | Performed by: STUDENT IN AN ORGANIZED HEALTH CARE EDUCATION/TRAINING PROGRAM

## 2022-03-17 NOTE — PROGRESS NOTES
Glenn Youngblood D.O.  Internal Medicine  [facility]  [address]  [place] [address]  [phone]      Chief Complaint  Establish Care    SUBJECTIVE    History of Present Illness    Brendan Vickers is a 56 y.o. male who presents to the office today as a new patient to establish care.   Previously saw Mary Ann PIPER who left the medical group.    HLD: taking atorvastatin 20 mg daily for primary prevention    Seasonal allergies: takes flonase nasal spray    HIV: Sees Dr Farr, takes Biktarvy, no missed doses, reports no detectable viral load     ED: has sildenafil 100 mg at home as needed      No Known Allergies     Outpatient Medications Marked as Taking for the 3/17/22 encounter (Office Visit) with Glenn Youngblood, DO   Medication Sig Dispense Refill   • atorvastatin (LIPITOR) 40 MG tablet Take 0.5 tablets by mouth Every Other Day. 90 tablet 0   • Biktarvy -25 MG per tablet Take 1 tablet by mouth Every Night. 30 tablet 4   • fluticasone (FLONASE) 50 MCG/ACT nasal spray 2 sprays into the nostril(s) as directed by provider Daily As Needed for Rhinitis.     • Multiple Vitamins-Minerals (MULTIVITAMIN PO) Take 1 tablet by mouth Daily. PT HOLDING FOR SURGERY     • sildenafil (Viagra) 100 MG tablet Take 1 tablet by mouth Daily As Needed for Erectile Dysfunction. TO HOLD 3 TO 5 DAYS BEFORE SURGERY 30 tablet 1        Past Medical History:   Diagnosis Date   • Colon polyp 06/04/2018    Transverse Colon Polyp x 2: Fragments of tubular adenoma, Repeat in 5 years, Dr. Ruggiero   • ED (erectile dysfunction)    • Erectile dysfunction    • HIV disease (HCC)    • Hyperlipidemia    • Lipoma     UPPER BACK   • Pneumocystis jiroveci pneumonia (HCC)    • Stage 3a chronic kidney disease (HCC)      Past Surgical History:   Procedure Laterality Date   • BACK SURGERY  2006    Dr. Celestin for ruptured disc lumbar spine   • COLONOSCOPY N/A 06/04/2018    Transverse Colon Polyp x 2: Fragments of  "tubular adenoma, Repeat in 5 years, Dr. Ruggiero   • EXCISION MASS TRUNK N/A 11/11/2019    Procedure: Excision of soft tissue neoplasm of upper back;  Surgeon: Efe Lopez Jr., MD;  Location: [facility];  Service: General     Family History   Problem Relation Age of Onset   • Diabetes Mother    • Heart attack Mother    • Breast cancer Mother    • Colon cancer Father 46   • Colon polyps Brother    • Diabetes Maternal Grandmother    • Malig Hyperthermia Neg Hx     reports that he has never smoked. He has never used smokeless tobacco. He reports that he does not drink alcohol and does not use drugs.    OBJECTIVE    Vital Signs:   /89   Pulse 73   Temp 97.7 °F (36.5 °C) (Temporal)   Ht 182.9 cm (72\")   Wt 89.8 kg (198 lb)   SpO2 97%   BMI 26.85 kg/m²     Physical Exam  Vitals reviewed.   Constitutional:       General: He is not in acute distress.     Appearance: Normal appearance. He is not ill-appearing.   HENT:      Head: Atraumatic.   Eyes:      General: No scleral icterus.  Cardiovascular:      Rate and Rhythm: Normal rate and regular rhythm.      Heart sounds: Normal heart sounds. No murmur heard.  Pulmonary:      Effort: Pulmonary effort is normal. No respiratory distress.      Breath sounds: Normal breath sounds. No wheezing or rhonchi.   Musculoskeletal:      Right lower leg: No edema.      Left lower leg: No edema.        Legs:    Neurological:      Mental Status: He is alert.   Psychiatric:         Mood and Affect: Mood normal.         Behavior: Behavior normal.         Thought Content: Thought content normal.            The following data was reviewed by: Glenn Youngblood DO on 03/17/2022:  Common labs    Common Labsle 7/12/21 7/12/21 7/12/21 1/14/22    1408 1408 1408    Glucose  98  54 (A)   BUN  12  12   Creatinine  1.26  1.20   eGFR Non  Am  59 (A)  62   Sodium  139  141   Potassium  4.2  4.1   Chloride  103  103   Calcium  9.4  9.2   Albumin  4.30  4.30   Total Bilirubin  0.5  0.5 "   Alkaline Phosphatase  67  72   AST (SGOT)  29  25   ALT (SGPT)  34  34   WBC 6.62  6.8    Hemoglobin 16.9  17.0    Hematocrit 48.4  49.2    Platelets 212  221    (A) Abnormal value                         ASSESSMENT & PLAN     Diagnoses and all orders for this visit:    1. Elevated blood pressure reading (Primary)    -BP is 130/89 in office today and per review of chart dating back to July 2021 he has had several other episodes of systolics ranging from 129-133 and diastolics ranging from 83-85.    -Asked patient to check his BP 3-5 times weekly at random times after he has been seated for 5 minutes or longer. Goal blood pressure is less than 130 systolic and less than 80 diastolic. Bring log to next visit.     2. Stage 2 chronic kidney disease  -Per review of chart from labs from this year dating back to 2018, patient's creatinine has averaged between 1.2-1.3 with an EGFR in the low 60s to upper 50s.  He does not have a diagnosis of hypertension or diabetes but does have diagnosis of HIV infection which could be the etiology of his very mild chronic kidney disease.  He does report a history of daily NSAID use for 3 to 4 months related to back pain years ago.  In general advised the patient to maintain good hydration and avoid NSAIDs..  -Given history of HIV infection I will refer patient to nephrology for evaluation  -     Ambulatory Referral to Nephrology    3. Atypical nevus  -right hip, see description above  -pt reports he believes the lesion is stable in size  -he follows with a dermatologist every summer and I recommended him bring it to their attention but at this point does not appear to need urgent biopsy    4. Hyperlipidemia, unspecified hyperlipidemia type  -compliant with atorvastatin 20 mg daily for primary prevention  -Last lipid profile was 3-4-2021 demonstrated total cholesterol 90, LDL 31, HDL 29, triglycerides 188. Overall excellent profile.  -continue current regimen            The following  social determinates of health impact the patient's medical decision making: No social determinates of health were factored in to today's visit.     Follow Up  Return in about 3 months (around 6/17/2022) for Annual physical.    Patient/family had no further questions at this time and verbalized understanding of the plan discussed today.

## 2022-03-21 ENCOUNTER — TELEPHONE (OUTPATIENT)
Dept: INFECTIOUS DISEASES | Facility: CLINIC | Age: 57
End: 2022-03-21

## 2022-03-21 RX ORDER — BICTEGRAVIR SODIUM, EMTRICITABINE, AND TENOFOVIR ALAFENAMIDE FUMARATE 50; 200; 25 MG/1; MG/1; MG/1
1 TABLET ORAL NIGHTLY
Qty: 90 TABLET | Refills: 1 | Status: SHIPPED | OUTPATIENT
Start: 2022-03-21 | End: 2023-01-11 | Stop reason: SDUPTHER

## 2022-03-21 NOTE — TELEPHONE ENCOUNTER
----- Message from Franklyn Farr MD sent at 3/21/2022 11:50 AM EDT -----  I submitted the 90d fill and also called him to let him know he could come by the office to get a sample if need be.  He asked that we call them at  to make sure they could ship it asap. Would you mind?  ----- Message -----  From: Yeni Martínez MA  Sent: 3/21/2022  11:36 AM EDT  To: Franklyn Farr MD    Please see Mikki's message      ----- Message -----  From: Yaneth Savage  Sent: 3/21/2022  11:02 AM EDT  To: Yeni Martínez MA    Patient called stated that his script is written for 30 day supply but his insurance is requiring 90 day supply.  If this is corrected today, they can ship it tomorrow, as he is almost out of the Biktarvy.      Biktarvy -25 MG per tablet       Pt's #: [phone]

## 2022-06-22 ENCOUNTER — OFFICE VISIT (OUTPATIENT)
Dept: INTERNAL MEDICINE | Facility: CLINIC | Age: 57
End: 2022-06-22

## 2022-06-22 VITALS
HEART RATE: 75 BPM | SYSTOLIC BLOOD PRESSURE: 122 MMHG | HEIGHT: 72 IN | BODY MASS INDEX: 26.14 KG/M2 | DIASTOLIC BLOOD PRESSURE: 78 MMHG | OXYGEN SATURATION: 96 % | WEIGHT: 193 LBS | TEMPERATURE: 98 F

## 2022-06-22 DIAGNOSIS — Z00.00 ANNUAL PHYSICAL EXAM: Primary | ICD-10-CM

## 2022-06-22 DIAGNOSIS — E78.1 HYPERTRIGLYCERIDEMIA: ICD-10-CM

## 2022-06-22 DIAGNOSIS — Z23 NEED FOR PNEUMOCOCCAL VACCINATION: ICD-10-CM

## 2022-06-22 DIAGNOSIS — B20 CURRENTLY ASYMPTOMATIC HIV INFECTION, WITH HISTORY OF HIV-RELATED ILLNESS: ICD-10-CM

## 2022-06-22 DIAGNOSIS — Z13.1 SCREENING FOR DIABETES MELLITUS: ICD-10-CM

## 2022-06-22 PROCEDURE — 99396 PREV VISIT EST AGE 40-64: CPT | Performed by: STUDENT IN AN ORGANIZED HEALTH CARE EDUCATION/TRAINING PROGRAM

## 2022-06-22 PROCEDURE — 90471 IMMUNIZATION ADMIN: CPT | Performed by: STUDENT IN AN ORGANIZED HEALTH CARE EDUCATION/TRAINING PROGRAM

## 2022-06-22 PROCEDURE — 90732 PPSV23 VACC 2 YRS+ SUBQ/IM: CPT | Performed by: STUDENT IN AN ORGANIZED HEALTH CARE EDUCATION/TRAINING PROGRAM

## 2022-06-22 NOTE — PROGRESS NOTES
Glenn Youngblood D.O.  Internal Medicine  [facility]  [address]  [place] [address]  [phone]    Chief Complaint  Annual checkup    HISTORY    Brendan Vickers is a 56 y.o. male who presents to the office today as a  an established patient for their annual preventative exam.      No hospitalization(s) within the last year.     Current exercise regimen: swims 4-5 times per week    Status of chronic medical conditions:    HLD: taking atorvastatin 20 mg 2-4 times per week for primary prevention     Seasonal allergies: takes flonase nasal spray     HIV: Sees Dr Farr, takes Biktarvy, no missed doses     ED: has sildenafil 100 mg at home as needed    Any other concerns regarding their health today: none    Health Maintenance Summary          Overdue - Pneumococcal Vaccine 0-64 (3 - PPSV23 or PCV20) Overdue since 11/1/2021 11/01/2016  Imm Admin: Pneumococcal Polysaccharide (PPSV23)    11/14/2015  Imm Admin: Pneumococcal Conjugate 13-Valent (PCV13)          Overdue - LIPID PANEL (Yearly) Overdue since 3/4/2022    03/04/2021  Lipid Panel With / Chol / HDL Ratio    02/27/2020  Lipid Panel With / Chol / HDL Ratio    10/29/2018  Lipid Panel With / Chol / HDL Ratio    09/01/2017  Patient-Reported (Performed Externally)          Overdue - ANNUAL PHYSICAL (Yearly) Overdue since 3/12/2022    03/11/2021  Done    03/11/2021  Registry Metric: Last Annual Physical    11/05/2018  Done          COVID-19 Vaccine (5 - Booster for Pfizer series) Next due on 8/12/2022 04/12/2022  Imm Admin: Covid-19 (Pfizer) Gray Cap    08/19/2021  Imm Admin: COVID-19 (PFIZER) PURPLE CAP    03/20/2021  Imm Admin: COVID-19 (PFIZER) PURPLE CAP    02/27/2021  Imm Admin: COVID-19 (PFIZER) PURPLE CAP          INFLUENZA VACCINE (Yearly - October to March) Next due on 10/1/2022    09/30/2021  Imm Admin: FluLaval/Fluarix/Fluzone >6    09/22/2020  Imm Admin: Flu Vaccine Quad PF >36MO    09/08/2019  Imm Admin:  FluLaval/Fluarix/Fluzone >6    10/08/2018  Imm Admin: Flu Vaccine Quad PF >36MO    10/08/2018  Imm Admin: FluLaval/Fluarix/Fluzone >6    Only the first 5 history entries have been loaded, but more history exists.          COLORECTAL CANCER SCREENING (COLONOSCOPY - Every 5 Years) Next due on 6/4/2023 06/04/2018  SCANNED - COLONOSCOPY    05/01/2013  COLONOSCOPY (Patient-Reported (Performed Externally))    01/01/2013  COLONOSCOPY (Done)          TDAP/TD VACCINES (2 - Td or Tdap) Next due on 11/5/2028 11/05/2018  Imm Admin: Tdap          Hepatitis B (Series Information) Completed    05/14/2019  Imm Admin: Hepatitis B    12/12/2018  Imm Admin: Hepatitis B    11/12/2018  Imm Admin: Hepatitis B          ZOSTER VACCINE (Series Information) Completed    06/13/2019  Imm Admin: Shingrix    11/28/2018  Imm Admin: Shingrix          HEPATITIS C SCREENING  Completed    07/12/2021  Hepatitis C Antibody    03/04/2021  Hep C Virus Ab component of Hepatitis C Antibody    11/05/2018  Patient-Reported (Performed Externally)                 No Known Allergies     Outpatient Medications Marked as Taking for the 6/22/22 encounter (Office Visit) with Glenn Youngblood,    Medication Sig Dispense Refill   • atorvastatin (LIPITOR) 40 MG tablet Take 0.5 tablets by mouth Every Other Day. 90 tablet 0   • Biktarvy -25 MG per tablet Take 1 tablet by mouth Every Night. 90 tablet 1   • fluticasone (FLONASE) 50 MCG/ACT nasal spray 2 sprays into the nostril(s) as directed by provider Daily As Needed for Rhinitis.     • Multiple Vitamins-Minerals (MULTIVITAMIN PO) Take 1 tablet by mouth Daily. PT HOLDING FOR SURGERY     • sildenafil (Viagra) 100 MG tablet Take 1 tablet by mouth Daily As Needed for Erectile Dysfunction. TO HOLD 3 TO 5 DAYS BEFORE SURGERY 30 tablet 1       Past Medical History:   Diagnosis Date   • Colon polyp 06/04/2018    Transverse Colon Polyp x 2: Fragments of tubular adenoma, Repeat in 5 years, Dr. Ruggiero   • ED  "(erectile dysfunction)    • Erectile dysfunction    • HIV disease (HCC)    • Hyperlipidemia    • Lipoma     UPPER BACK   • Pneumocystis jiroveci pneumonia (HCC)    • Stage 3a chronic kidney disease (HCC)      Past Surgical History:   Procedure Laterality Date   • BACK SURGERY  2006    Dr. Celestin for ruptured disc lumbar spine   • COLONOSCOPY N/A 06/04/2018    Transverse Colon Polyp x 2: Fragments of tubular adenoma, Repeat in 5 years, Dr. Ruggiero   • EXCISION MASS TRUNK N/A 11/11/2019    Procedure: Excision of soft tissue neoplasm of upper back;  Surgeon: Efe Lopez Jr., MD;  Location: [facility];  Service: General     Family History   Problem Relation Age of Onset   • Diabetes Mother    • Heart attack Mother    • Breast cancer Mother    • Colon cancer Father 46   • Colon polyps Brother    • Diabetes Maternal Grandmother    • Malig Hyperthermia Neg Hx     reports that he has never smoked. He has never used smokeless tobacco. He reports that he does not drink alcohol and does not use drugs.    Immunization History   Administered Date(s) Administered   • COVID-19 (PFIZER) PURPLE CAP 02/27/2021, 03/20/2021, 08/19/2021   • Covid-19 (Pfizer) Gray Cap 04/12/2022   • Flu Vaccine Quad PF >36MO 10/08/2018, 09/22/2020   • FluLaval/Fluarix/Fluzone >6 10/08/2018, 09/08/2019, 09/30/2021   • FluMist 2-49yrs 10/11/2017   • Hepatitis A 04/20/2018, 10/20/2018   • Hepatitis B 11/12/2018, 12/12/2018, 05/14/2019   • Pneumococcal Conjugate 13-Valent (PCV13) 11/14/2015   • Pneumococcal Polysaccharide (PPSV23) 11/01/2016   • Shingrix 11/28/2018, 06/13/2019   • Tdap 11/05/2018        OBJECTIVE    Vital Signs:   /78   Pulse 75   Temp 98 °F (36.7 °C) (Temporal)   Ht 182.9 cm (72\")   Wt 87.5 kg (193 lb)   SpO2 96%   BMI 26.18 kg/m²     Physical Exam  Vitals reviewed.   Constitutional:       General: He is not in acute distress.     Appearance: Normal appearance. He is not ill-appearing.   HENT:      Head: " Normocephalic and atraumatic.      Right Ear: Tympanic membrane, ear canal and external ear normal. There is no impacted cerumen.      Left Ear: Tympanic membrane, ear canal and external ear normal. There is no impacted cerumen.      Mouth/Throat:      Mouth: Mucous membranes are moist.      Pharynx: No oropharyngeal exudate or posterior oropharyngeal erythema.   Eyes:      General: No scleral icterus.     Extraocular Movements: Extraocular movements intact.      Conjunctiva/sclera: Conjunctivae normal.      Pupils: Pupils are equal, round, and reactive to light.   Cardiovascular:      Rate and Rhythm: Normal rate and regular rhythm.      Heart sounds: Normal heart sounds. No murmur heard.  Pulmonary:      Effort: Pulmonary effort is normal. No respiratory distress.      Breath sounds: Normal breath sounds. No wheezing.   Abdominal:      General: Bowel sounds are normal. There is no distension.      Palpations: Abdomen is soft.      Tenderness: There is no abdominal tenderness. There is no guarding.   Musculoskeletal:      Cervical back: Neck supple.      Right lower leg: No edema.      Left lower leg: No edema.   Lymphadenopathy:      Cervical: No cervical adenopathy.   Skin:     General: Skin is warm and dry.      Coloration: Skin is not jaundiced.   Neurological:      General: No focal deficit present.      Mental Status: He is alert and oriented to person, place, and time.      Cranial Nerves: No cranial nerve deficit.      Motor: No weakness.   Psychiatric:         Mood and Affect: Mood normal.         Behavior: Behavior normal.         Thought Content: Thought content normal.                         ASSESSMENT & PLAN     1. Annual Preventative Health Examination  -Age and sex appropriate physical exam performed and documented. Updated past medical, family, social and surgical histories as well as allergies and care team list. Addressed care gaps listed in the medical record.  -Encouraged seat belt use for  every car ride for patient and all occupants. Encouraged sunscreen use to reduce risk of skin cancer for any days with sun exposure over 20 minutes. Discussed the importance of smoke and carbon monoxide detectors in the home.   -Encouraged annual dental and vision exams as part of their overall health.  -Encouraged minimum of 30 minutes or more of exercise at a brisk walk or higher 5 days per week combined with a well-balanced diet.   -Immunizations reviewed and updated in EMR. Recommended the following vaccines for the patient: pneumovax 23 accepted, given in office today  -Lipid screening:  Patient is already on statin therapy.   -Aspirin for primary or secondary prevention: ASCVD risk calculate and aspirin for primary prevention is not indicated.  -Depression screening: PHQ2 performed and the patient's screen was negative.  -Diabetes screening:  Patient is 35-70 years of age and overweight, screening for diabetes is indicated every 3 years.   -Tobacco use screening: Patient denies cigarette use. Tobacco counseling was was not indicated.  -Alcohol use screening: Patient denies alcohol consumption.. Alcohol abuse counseling was was not indicated.  -Illicit drug screening: Patient does not use illicit drugs.   -Abdominal aortic aneurysm screening: AAA screening is not indicated as patient is less than 65 years of age.  -Hypertension screening: Patient screened negative for HTN today.  -HIV screening: Patient has known HIV infection, screening not indicated.   -Hepatitis C virus screening:  Screening up to date and negative  -Syphilis screening: Screening up to date and negative  -Hepatitis B virus screening: Screening up to date and negative  -Colon cancer screening: Patient is already up to date on their colon cancer screening with colonoscopy is indicated again in 2023  -Lung cancer screening: Patient has never smoked.  -Prostate cancer screening: Last PSA test was 1.100  on 3/2021 and re-screening is indicated  every 2 years.  -Bone density screening: will obtain today secondary to HIV     Follow up in 1 year for annual physical exam.    Patient/family had no further questions at this time and verbalized understanding of the plan discussed today.

## 2022-06-23 LAB
BASOPHILS # BLD AUTO: 0 X10E3/UL (ref 0–0.2)
BASOPHILS NFR BLD AUTO: 0 %
CHOLEST SERPL-MCNC: 99 MG/DL (ref 100–199)
EOSINOPHIL # BLD AUTO: 0.1 X10E3/UL (ref 0–0.4)
EOSINOPHIL NFR BLD AUTO: 2 %
ERYTHROCYTE [DISTWIDTH] IN BLOOD BY AUTOMATED COUNT: 13.4 % (ref 11.6–15.4)
HBA1C MFR BLD: 5.5 % (ref 4.8–5.6)
HCT VFR BLD AUTO: 48.4 % (ref 37.5–51)
HDLC SERPL-MCNC: 28 MG/DL
HGB BLD-MCNC: 16.8 G/DL (ref 13–17.7)
IMM GRANULOCYTES # BLD AUTO: 0 X10E3/UL (ref 0–0.1)
IMM GRANULOCYTES NFR BLD AUTO: 0 %
LDLC SERPL CALC-MCNC: 40 MG/DL (ref 0–99)
LDLC/HDLC SERPL: 1.4 RATIO (ref 0–3.6)
LYMPHOCYTES # BLD AUTO: 2.3 X10E3/UL (ref 0.7–3.1)
LYMPHOCYTES NFR BLD AUTO: 41 %
MCH RBC QN AUTO: 31.7 PG (ref 26.6–33)
MCHC RBC AUTO-ENTMCNC: 34.7 G/DL (ref 31.5–35.7)
MCV RBC AUTO: 91 FL (ref 79–97)
MONOCYTES # BLD AUTO: 0.5 X10E3/UL (ref 0.1–0.9)
MONOCYTES NFR BLD AUTO: 9 %
NEUTROPHILS # BLD AUTO: 2.6 X10E3/UL (ref 1.4–7)
NEUTROPHILS NFR BLD AUTO: 48 %
PLATELET # BLD AUTO: 214 X10E3/UL (ref 150–450)
RBC # BLD AUTO: 5.3 X10E6/UL (ref 4.14–5.8)
TRIGL SERPL-MCNC: 192 MG/DL (ref 0–149)
VLDLC SERPL CALC-MCNC: 31 MG/DL (ref 5–40)
WBC # BLD AUTO: 5.5 X10E3/UL (ref 3.4–10.8)

## 2022-07-13 ENCOUNTER — OFFICE VISIT (OUTPATIENT)
Dept: INFECTIOUS DISEASES | Facility: CLINIC | Age: 57
End: 2022-07-13

## 2022-07-13 ENCOUNTER — LAB (OUTPATIENT)
Dept: LAB | Facility: HOSPITAL | Age: 57
End: 2022-07-13

## 2022-07-13 VITALS
TEMPERATURE: 97.9 F | HEART RATE: 57 BPM | DIASTOLIC BLOOD PRESSURE: 75 MMHG | SYSTOLIC BLOOD PRESSURE: 116 MMHG | RESPIRATION RATE: 18 BRPM | HEIGHT: 72 IN | WEIGHT: 195.2 LBS | BODY MASS INDEX: 26.44 KG/M2

## 2022-07-13 DIAGNOSIS — B20 CURRENTLY ASYMPTOMATIC HIV INFECTION, WITH HISTORY OF HIV-RELATED ILLNESS: Primary | ICD-10-CM

## 2022-07-13 DIAGNOSIS — Z79.2 LONG TERM (CURRENT) USE OF ANTIBIOTICS: ICD-10-CM

## 2022-07-13 LAB
ALBUMIN SERPL-MCNC: 4.4 G/DL (ref 3.5–5.2)
ALBUMIN/GLOB SERPL: 1.6 G/DL
ALP SERPL-CCNC: 68 U/L (ref 39–117)
ALT SERPL W P-5'-P-CCNC: 34 U/L (ref 1–41)
ANION GAP SERPL CALCULATED.3IONS-SCNC: 8.2 MMOL/L (ref 5–15)
AST SERPL-CCNC: 31 U/L (ref 1–40)
BASOPHILS # BLD AUTO: 0.03 10*3/MM3 (ref 0–0.2)
BASOPHILS NFR BLD AUTO: 0.6 % (ref 0–1.5)
BILIRUB SERPL-MCNC: 0.6 MG/DL (ref 0–1.2)
BUN SERPL-MCNC: 13 MG/DL (ref 6–20)
BUN/CREAT SERPL: 9.8 (ref 7–25)
CALCIUM SPEC-SCNC: 9.1 MG/DL (ref 8.6–10.5)
CHLORIDE SERPL-SCNC: 101 MMOL/L (ref 98–107)
CO2 SERPL-SCNC: 26.8 MMOL/L (ref 22–29)
CREAT SERPL-MCNC: 1.33 MG/DL (ref 0.76–1.27)
DEPRECATED RDW RBC AUTO: 43.1 FL (ref 37–54)
EGFRCR SERPLBLD CKD-EPI 2021: 62 ML/MIN/1.73
EOSINOPHIL # BLD AUTO: 0.11 10*3/MM3 (ref 0–0.4)
EOSINOPHIL NFR BLD AUTO: 2.1 % (ref 0.3–6.2)
ERYTHROCYTE [DISTWIDTH] IN BLOOD BY AUTOMATED COUNT: 13 % (ref 12.3–15.4)
GLOBULIN UR ELPH-MCNC: 2.8 GM/DL
GLUCOSE SERPL-MCNC: 77 MG/DL (ref 65–99)
HCT VFR BLD AUTO: 46.1 % (ref 37.5–51)
HCV AB SER DONR QL: NORMAL
HGB BLD-MCNC: 16.4 G/DL (ref 13–17.7)
IMM GRANULOCYTES # BLD AUTO: 0.01 10*3/MM3 (ref 0–0.05)
IMM GRANULOCYTES NFR BLD AUTO: 0.2 % (ref 0–0.5)
LYMPHOCYTES # BLD AUTO: 2.31 10*3/MM3 (ref 0.7–3.1)
LYMPHOCYTES NFR BLD AUTO: 44.7 % (ref 19.6–45.3)
MCH RBC QN AUTO: 32.2 PG (ref 26.6–33)
MCHC RBC AUTO-ENTMCNC: 35.6 G/DL (ref 31.5–35.7)
MCV RBC AUTO: 90.6 FL (ref 79–97)
MONOCYTES # BLD AUTO: 0.51 10*3/MM3 (ref 0.1–0.9)
MONOCYTES NFR BLD AUTO: 9.9 % (ref 5–12)
NEUTROPHILS NFR BLD AUTO: 2.2 10*3/MM3 (ref 1.7–7)
NEUTROPHILS NFR BLD AUTO: 42.5 % (ref 42.7–76)
NRBC BLD AUTO-RTO: 0 /100 WBC (ref 0–0.2)
PLATELET # BLD AUTO: 208 10*3/MM3 (ref 140–450)
PMV BLD AUTO: 10.3 FL (ref 6–12)
POTASSIUM SERPL-SCNC: 4.3 MMOL/L (ref 3.5–5.2)
PROT SERPL-MCNC: 7.2 G/DL (ref 6–8.5)
RBC # BLD AUTO: 5.09 10*6/MM3 (ref 4.14–5.8)
SODIUM SERPL-SCNC: 136 MMOL/L (ref 136–145)
WBC NRBC COR # BLD: 5.17 10*3/MM3 (ref 3.4–10.8)

## 2022-07-13 PROCEDURE — 86803 HEPATITIS C AB TEST: CPT | Performed by: INTERNAL MEDICINE

## 2022-07-13 PROCEDURE — 87591 N.GONORRHOEAE DNA AMP PROB: CPT | Performed by: INTERNAL MEDICINE

## 2022-07-13 PROCEDURE — 86361 T CELL ABSOLUTE COUNT: CPT | Performed by: INTERNAL MEDICINE

## 2022-07-13 PROCEDURE — 87536 HIV-1 QUANT&REVRSE TRNSCRPJ: CPT | Performed by: INTERNAL MEDICINE

## 2022-07-13 PROCEDURE — 87491 CHLMYD TRACH DNA AMP PROBE: CPT | Performed by: INTERNAL MEDICINE

## 2022-07-13 PROCEDURE — 80053 COMPREHEN METABOLIC PANEL: CPT | Performed by: INTERNAL MEDICINE

## 2022-07-13 PROCEDURE — 36415 COLL VENOUS BLD VENIPUNCTURE: CPT | Performed by: INTERNAL MEDICINE

## 2022-07-13 PROCEDURE — 99214 OFFICE O/P EST MOD 30 MIN: CPT | Performed by: INTERNAL MEDICINE

## 2022-07-13 PROCEDURE — 86592 SYPHILIS TEST NON-TREP QUAL: CPT | Performed by: INTERNAL MEDICINE

## 2022-07-13 PROCEDURE — 85025 COMPLETE CBC W/AUTO DIFF WBC: CPT | Performed by: INTERNAL MEDICINE

## 2022-07-13 NOTE — PROGRESS NOTES
"cc: Here for HIV    Per initial note in July 2021: Patient reports that he was in his usual state of health until approximately 2007 when he he became ill.  He actually required hospitalization at things and around January 2008 for pneumocystis pneumonia at which time he was diagnosed with HIV.  He had very low T-cell counts.  He thinks he was started on Combivir and boosted Reyataz but was quickly switched to a Atripla.  He had vivid dreams with a Atripla and was switched to Biktarvy around 2018 or 2019.  Has done really well with the Biktarvy.  No side effects or missed doses.  Remains asymptomatic from HIV he has been undetectable for years.  His CD4 count at last check was in the 900s.  He had been following with Dr Wu, but is transferring to our clinic due to her [facility].\"    At last visit in Jan 2022 he was undetectable.  He denies any symptoms of HIV.   Tolerating Biktarvy without missed doses or side effects.  He is planning a trip to [facility] later this summer. Has been swimming. Concerned about        PMH: HIV, HLD, back surgery  NKA  FMH: no 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 No t/e/d.  He enjoys travel and swimming.  He is in a monogamous relationship and they do not use protection during sexual activity      Review of Systems: All other reviewed and negative except as per HPI    Blood pressure 116/75, pulse 57, temperature 97.9 °F (36.6 °C), resp. rate 18, height 182.9 cm (72\"), weight 88.5 kg (195 lb 3.2 oz).  GENERAL: Awake and alert, in no acute distress.   HEENT:  Hearing is grossly normal.   Pulm effort normal  PSYCHIATRIC: Appropriate mood, affect, insight, and judgment.       DIAGNOSTICS:  HIV RNA (GT=??? )  --7/12/21 <20  --1/21 <20    CD4  --7/12/21 945/45%    Hepatitis  --HAV 7/12/21 Ab+  --HBV 7/12/21 sAB+  --HCV NR 7/12/21    STI  --RPR NR 7/12/21  --Urine GC NR 7/12/21    Vaccines  --HAV  --HBV  --PPSV " 23  --PCV13  --Tdap    Misc  --Tspot  --[id number]          Assessment and Plan  Asymptomatic HIV infection, with history of HIV-related illness (CMS/HCC)  Long term (current) use of antibiotics    He is doing very well on Biktarvy and will cont. CMP and HIV viral load today along with CBC, CD4, RPR, HCV and Urine GC  Okay for meningitis vaccine and BMD testing  RTC in 6 mo or sooner if needed

## 2022-07-14 LAB
BASOPHILS # BLD AUTO: 0 X10E3/UL (ref 0–0.2)
BASOPHILS NFR BLD AUTO: 0 %
C TRACH RRNA SPEC QL NAA+PROBE: NEGATIVE
CD3+CD4+ CELLS # BLD: 1068 /UL (ref 359–1519)
CD3+CD4+ CELLS NFR BLD: 44.5 % (ref 30.8–58.5)
EOSINOPHIL # BLD AUTO: 0.1 X10E3/UL (ref 0–0.4)
EOSINOPHIL NFR BLD AUTO: 2 %
ERYTHROCYTE [DISTWIDTH] IN BLOOD BY AUTOMATED COUNT: 13.4 % (ref 11.6–15.4)
HCT VFR BLD AUTO: 49 % (ref 37.5–51)
HGB BLD-MCNC: 16.9 G/DL (ref 13–17.7)
IMM GRANULOCYTES # BLD AUTO: 0 X10E3/UL (ref 0–0.1)
IMM GRANULOCYTES NFR BLD AUTO: 0 %
LYMPHOCYTES # BLD AUTO: 2.4 X10E3/UL (ref 0.7–3.1)
LYMPHOCYTES NFR BLD AUTO: 46 %
MCH RBC QN AUTO: 32 PG (ref 26.6–33)
MCHC RBC AUTO-ENTMCNC: 34.5 G/DL (ref 31.5–35.7)
MCV RBC AUTO: 93 FL (ref 79–97)
MONOCYTES # BLD AUTO: 0.5 X10E3/UL (ref 0.1–0.9)
MONOCYTES NFR BLD AUTO: 10 %
N GONORRHOEA RRNA SPEC QL NAA+PROBE: NEGATIVE
NEUTROPHILS # BLD AUTO: 2.2 X10E3/UL (ref 1.4–7)
NEUTROPHILS NFR BLD AUTO: 42 %
PLATELET # BLD AUTO: 230 X10E3/UL (ref 150–450)
RBC # BLD AUTO: 5.28 X10E6/UL (ref 4.14–5.8)
RPR SER QL: NORMAL
WBC # BLD AUTO: 5.3 X10E3/UL (ref 3.4–10.8)

## 2022-07-15 LAB
HIV1 RNA # SERPL NAA+PROBE: 580 COPIES/ML
HIV1 RNA SERPL NAA+PROBE-LOG#: 2.76 LOG10COPY/ML

## 2022-07-22 DIAGNOSIS — B20 CURRENTLY ASYMPTOMATIC HIV INFECTION, WITH HISTORY OF HIV-RELATED ILLNESS: Primary | ICD-10-CM

## 2022-07-22 NOTE — PROGRESS NOTES
D/w pt labs were acceptable except his viral load was 580.  100% adherence.  No OTC supplements and only other medication is statin.  Possible blip?  We will repeat HIV viral load and genotype which have been ordered.

## 2022-07-25 ENCOUNTER — LAB (OUTPATIENT)
Dept: LAB | Facility: HOSPITAL | Age: 57
End: 2022-07-25

## 2022-07-25 DIAGNOSIS — B20 CURRENTLY ASYMPTOMATIC HIV INFECTION, WITH HISTORY OF HIV-RELATED ILLNESS: ICD-10-CM

## 2022-07-25 PROCEDURE — 87536 HIV-1 QUANT&REVRSE TRNSCRPJ: CPT

## 2022-07-25 PROCEDURE — 36415 COLL VENOUS BLD VENIPUNCTURE: CPT

## 2022-07-26 LAB
HIV1 RNA # SERPL NAA+PROBE: 1230 COPIES/ML
HIV1 RNA SERPL NAA+PROBE-LOG#: 3.09 LOG10COPY/ML

## 2022-08-02 DIAGNOSIS — B20 CURRENTLY ASYMPTOMATIC HIV INFECTION, WITH HISTORY OF HIV-RELATED ILLNESS: Primary | ICD-10-CM

## 2022-08-05 ENCOUNTER — LAB (OUTPATIENT)
Dept: LAB | Facility: HOSPITAL | Age: 57
End: 2022-08-05

## 2022-08-05 DIAGNOSIS — B20 CURRENTLY ASYMPTOMATIC HIV INFECTION, WITH HISTORY OF HIV-RELATED ILLNESS: ICD-10-CM

## 2022-08-05 PROCEDURE — 36415 COLL VENOUS BLD VENIPUNCTURE: CPT

## 2022-08-05 PROCEDURE — 87536 HIV-1 QUANT&REVRSE TRNSCRPJ: CPT

## 2022-08-07 LAB
HIV1 RNA # SERPL NAA+PROBE: 50 COPIES/ML
HIV1 RNA SERPL NAA+PROBE-LOG#: 1.7 LOG10COPY/ML

## 2022-08-08 NOTE — PROGRESS NOTES
Discussed with patient that his viral load was back down nearly undetectable at 50.  Suspect he may have had an exaggerated blip or some type of illness that interfere with his virologic control.  We will continue to press on with his ART and no changes necessary at this time

## 2022-08-20 LAB
GENE ANALYSIS NARR RPT DOC: NORMAL
SPECIMEN CONDITION: NORMAL

## 2022-10-13 ENCOUNTER — APPOINTMENT (OUTPATIENT)
Dept: BONE DENSITY | Facility: HOSPITAL | Age: 57
End: 2022-10-13

## 2022-12-20 ENCOUNTER — TELEPHONE (OUTPATIENT)
Dept: GASTROENTEROLOGY | Facility: CLINIC | Age: 57
End: 2022-12-20

## 2022-12-20 NOTE — TELEPHONE ENCOUNTER
FAST TRACK - 5 YEAR RECALL 05/2023  LAST COLONOSCOPY 06/04/2018  PERSONAL HISTORY POLYPS  FAMILY HISTORY CRC, FATHER AGE 47  FAMILY HISTORY POLYPS, BROTHER AGE 57  SCHEDULE AT [facility].

## 2022-12-21 ENCOUNTER — PREP FOR SURGERY (OUTPATIENT)
Dept: SURGERY | Facility: SURGERY CENTER | Age: 57
End: 2022-12-21

## 2022-12-21 DIAGNOSIS — Z86.010 PERSONAL HISTORY OF COLONIC POLYPS: Primary | ICD-10-CM

## 2022-12-29 PROBLEM — Z86.0100 PERSONAL HISTORY OF COLONIC POLYPS: Status: ACTIVE | Noted: 2022-12-29

## 2022-12-29 PROBLEM — Z86.010 PERSONAL HISTORY OF COLONIC POLYPS: Status: ACTIVE | Noted: 2022-12-29

## 2022-12-29 NOTE — TELEPHONE ENCOUNTER
Spoke with patient.  Scheduled at [facility] 06/06/2023 at 12:30pm - arrive 11:30am.  Will mail instructions.

## 2023-01-11 ENCOUNTER — OFFICE VISIT (OUTPATIENT)
Dept: INFECTIOUS DISEASES | Facility: CLINIC | Age: 58
End: 2023-01-11
Payer: COMMERCIAL

## 2023-01-11 ENCOUNTER — LAB (OUTPATIENT)
Dept: LAB | Facility: HOSPITAL | Age: 58
End: 2023-01-11
Payer: COMMERCIAL

## 2023-01-11 VITALS
DIASTOLIC BLOOD PRESSURE: 90 MMHG | HEIGHT: 72 IN | WEIGHT: 196.8 LBS | BODY MASS INDEX: 26.66 KG/M2 | SYSTOLIC BLOOD PRESSURE: 144 MMHG | TEMPERATURE: 97.8 F | RESPIRATION RATE: 18 BRPM | HEART RATE: 66 BPM

## 2023-01-11 DIAGNOSIS — Z79.2 LONG TERM (CURRENT) USE OF ANTIBIOTICS: ICD-10-CM

## 2023-01-11 DIAGNOSIS — E78.5 HYPERLIPIDEMIA, UNSPECIFIED HYPERLIPIDEMIA TYPE: Chronic | ICD-10-CM

## 2023-01-11 DIAGNOSIS — Z21 ASYMPTOMATIC HIV INFECTION, WITH NO HISTORY OF HIV-RELATED ILLNESS: Primary | ICD-10-CM

## 2023-01-11 LAB
ALBUMIN SERPL-MCNC: 4.3 G/DL (ref 3.5–5.2)
ALBUMIN/GLOB SERPL: 1.5 G/DL
ALP SERPL-CCNC: 75 U/L (ref 39–117)
ALT SERPL W P-5'-P-CCNC: 42 U/L (ref 1–41)
ANION GAP SERPL CALCULATED.3IONS-SCNC: 6.3 MMOL/L (ref 5–15)
AST SERPL-CCNC: 28 U/L (ref 1–40)
BILIRUB SERPL-MCNC: 0.4 MG/DL (ref 0–1.2)
BUN SERPL-MCNC: 14 MG/DL (ref 6–20)
BUN/CREAT SERPL: 11 (ref 7–25)
CALCIUM SPEC-SCNC: 9.2 MG/DL (ref 8.6–10.5)
CHLORIDE SERPL-SCNC: 101 MMOL/L (ref 98–107)
CO2 SERPL-SCNC: 29.7 MMOL/L (ref 22–29)
CREAT SERPL-MCNC: 1.27 MG/DL (ref 0.76–1.27)
EGFRCR SERPLBLD CKD-EPI 2021: 65.1 ML/MIN/1.73
GLOBULIN UR ELPH-MCNC: 2.8 GM/DL
GLUCOSE SERPL-MCNC: 102 MG/DL (ref 65–99)
POTASSIUM SERPL-SCNC: 4.3 MMOL/L (ref 3.5–5.2)
PROT SERPL-MCNC: 7.1 G/DL (ref 6–8.5)
SODIUM SERPL-SCNC: 137 MMOL/L (ref 136–145)

## 2023-01-11 PROCEDURE — 36415 COLL VENOUS BLD VENIPUNCTURE: CPT | Performed by: INTERNAL MEDICINE

## 2023-01-11 PROCEDURE — 80053 COMPREHEN METABOLIC PANEL: CPT | Performed by: INTERNAL MEDICINE

## 2023-01-11 PROCEDURE — 99214 OFFICE O/P EST MOD 30 MIN: CPT | Performed by: INTERNAL MEDICINE

## 2023-01-11 PROCEDURE — 87536 HIV-1 QUANT&REVRSE TRNSCRPJ: CPT | Performed by: INTERNAL MEDICINE

## 2023-01-11 PROCEDURE — 86361 T CELL ABSOLUTE COUNT: CPT | Performed by: INTERNAL MEDICINE

## 2023-01-11 RX ORDER — BICTEGRAVIR SODIUM, EMTRICITABINE, AND TENOFOVIR ALAFENAMIDE FUMARATE 50; 200; 25 MG/1; MG/1; MG/1
1 TABLET ORAL NIGHTLY
Qty: 90 TABLET | Refills: 1 | Status: SHIPPED | OUTPATIENT
Start: 2023-01-11

## 2023-01-11 RX ORDER — ATORVASTATIN CALCIUM 20 MG/1
20 TABLET, FILM COATED ORAL EVERY OTHER DAY
Qty: 90 TABLET | Refills: 1 | Status: SHIPPED | OUTPATIENT
Start: 2023-01-11

## 2023-01-11 NOTE — PROGRESS NOTES
"cc: Here for HIV    Per initial note in July 2021: Patient reports that he was in his usual state of health until approximately 2007 when he he became ill.  He actually required hospitalization at things and around January 2008 for pneumocystis pneumonia at which time he was diagnosed with HIV.  He had very low T-cell counts.  He thinks he was started on Combivir and boosted Reyataz but was quickly switched to a Atripla.  He had vivid dreams with a Atripla and was switched to Biktarvy around 2018 or 2019.  Has done really well with the Biktarvy.  No side effects or missed doses.  Remains asymptomatic from HIV he has been undetectable for years.  His CD4 count at last check was in the 900s.  He had been following with Dr Wu, but is transferring to our clinic due to [facility].\"    At last visit in July 2022 he had fairly significant viremia as below.  This eventually came down and he has since been asymptomatic.  He denies any issues.  Blood pressure still little bit high.  He is not having fevers or chills or night sweats.  No side effects or missed doses from Biktarvy      PMH: HIV, HLD, back surgery  NKA  FMH: no 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  He enjoys travel and swimming.  He is in a monogamous relationship and they do not use protection during sexual activity      Review of Systems: All other reviewed and negative except as per HPI    Blood pressure 144/90, pulse 66, temperature 97.8 °F (36.6 °C), temperature source Temporal, resp. rate 18, height 182.9 cm (72\"), weight 89.3 kg (196 lb 12.8 oz).  GENERAL: Awake and alert, in no acute distress.   HEENT:  Hearing is grossly normal.   Pulm effort normal  PSYCHIATRIC: Appropriate mood, affect, insight, and judgment.       DIAGNOSTICS:  HIV RNA (GT=??? )    --1/21 <20  --7/12/21 <20  --1/22 <20  --7/22 580  --7/22 1230  --8/22 50      CD4  --7/12/21 945/45%  --7/22 1068/45%    Hepatitis  --HAV 7/12/21 " Ab+  --HBV 7/12/21 sAB+  --HCV NR 7/12/21    STI  --RPR NR 7/12/21, 7/22  --Urine GC NR 7/12/21, 7/22    Vaccines  --HAV  --HBV  --PPSV 23  --PCV13  --Tdap    Misc  --Tspot  --[id number]          Assessment and Plan  Asymptomatic HIV infection, with history of HIV-related illness (CMS/HCC)  Long term (current) use of antibiotics  Hyperlipidemia, refilled atorvastatin 20 for 6 months until he can establish with PCP.  Hypertension, very borderline.  Asked him to keep blood pressure log weekly.  Discussed with PCP if remains elevated    He is doing very well on Biktarvy and will cont. CMP and HIV viral load today along with Cd4  RTC in 6 mo or sooner if needed

## 2023-01-12 LAB
BASOPHILS # BLD AUTO: 0 X10E3/UL (ref 0–0.2)
BASOPHILS NFR BLD AUTO: 0 %
CD3+CD4+ CELLS # BLD: 984 /UL (ref 359–1519)
CD3+CD4+ CELLS NFR BLD: 42.8 % (ref 30.8–58.5)
EOSINOPHIL # BLD AUTO: 0.1 X10E3/UL (ref 0–0.4)
EOSINOPHIL NFR BLD AUTO: 2 %
ERYTHROCYTE [DISTWIDTH] IN BLOOD BY AUTOMATED COUNT: 13.6 % (ref 11.6–15.4)
HCT VFR BLD AUTO: 47.9 % (ref 37.5–51)
HGB BLD-MCNC: 16.2 G/DL (ref 13–17.7)
IMM GRANULOCYTES # BLD AUTO: 0 X10E3/UL (ref 0–0.1)
IMM GRANULOCYTES NFR BLD AUTO: 0 %
LYMPHOCYTES # BLD AUTO: 2.3 X10E3/UL (ref 0.7–3.1)
LYMPHOCYTES NFR BLD AUTO: 34 %
MCH RBC QN AUTO: 30.9 PG (ref 26.6–33)
MCHC RBC AUTO-ENTMCNC: 33.8 G/DL (ref 31.5–35.7)
MCV RBC AUTO: 91 FL (ref 79–97)
MONOCYTES # BLD AUTO: 0.6 X10E3/UL (ref 0.1–0.9)
MONOCYTES NFR BLD AUTO: 9 %
NEUTROPHILS # BLD AUTO: 3.6 X10E3/UL (ref 1.4–7)
NEUTROPHILS NFR BLD AUTO: 55 %
PLATELET # BLD AUTO: 216 X10E3/UL (ref 150–450)
RBC # BLD AUTO: 5.25 X10E6/UL (ref 4.14–5.8)
WBC # BLD AUTO: 6.7 X10E3/UL (ref 3.4–10.8)

## 2023-01-13 LAB
HIV1 RNA # SERPL NAA+PROBE: <20 COPIES/ML
HIV1 RNA SERPL NAA+PROBE-LOG#: NORMAL LOG10COPY/ML

## 2023-01-16 ENCOUNTER — TELEPHONE (OUTPATIENT)
Dept: INFECTIOUS DISEASES | Facility: CLINIC | Age: 58
End: 2023-01-16
Payer: COMMERCIAL

## 2023-06-05 NOTE — SIGNIFICANT NOTE
Education provided the Patient on the following:    - Nothing to Eat or Drink after MN the night before the procedure    - Avoid red/purple fluids while completing their bowel prep as ordered by physician  -Contact Gastrointerologist office for any questions about specific details regarding colon prep    -You will need to have someone drive you home after your colonoscopy and remain with you for 24 hours after the procedure  - The date of your Surgery, you may have one visitor at bedside or within 10-15 minutes of [facility]  -Please wear warm socks when you arrive for your colonoscopy  -Remove all jewelry and leave any valuables before arriving the day of your procedure (all will have to be removed before leaving preop)  -You will need to arrive at 1130 on 6/6/2023 for your colonoscopy    -Feel free to contact us at: [phone] with any additional questions/concerns

## 2023-06-06 ENCOUNTER — HOSPITAL ENCOUNTER (OUTPATIENT)
Facility: SURGERY CENTER | Age: 58
Setting detail: HOSPITAL OUTPATIENT SURGERY
Discharge: HOME | End: 2023-06-06
Attending: INTERNAL MEDICINE | Admitting: INTERNAL MEDICINE
Payer: COMMERCIAL

## 2023-06-06 ENCOUNTER — ANESTHESIA (OUTPATIENT)
Dept: SURGERY | Facility: SURGERY CENTER | Age: 58
End: 2023-06-06
Payer: COMMERCIAL

## 2023-06-06 ENCOUNTER — ANESTHESIA EVENT (OUTPATIENT)
Dept: SURGERY | Facility: SURGERY CENTER | Age: 58
End: 2023-06-06
Payer: COMMERCIAL

## 2023-06-06 VITALS
DIASTOLIC BLOOD PRESSURE: 77 MMHG | HEIGHT: 72 IN | SYSTOLIC BLOOD PRESSURE: 114 MMHG | WEIGHT: 187 LBS | TEMPERATURE: 98.4 F | HEART RATE: 56 BPM | OXYGEN SATURATION: 99 % | RESPIRATION RATE: 16 BRPM | BODY MASS INDEX: 25.33 KG/M2

## 2023-06-06 DIAGNOSIS — Z86.010 PERSONAL HISTORY OF COLONIC POLYPS: ICD-10-CM

## 2023-06-06 PROCEDURE — 45380 COLONOSCOPY AND BIOPSY: CPT | Performed by: INTERNAL MEDICINE

## 2023-06-06 PROCEDURE — 88305 TISSUE EXAM BY PATHOLOGIST: CPT | Performed by: INTERNAL MEDICINE

## 2023-06-06 PROCEDURE — 25010000002 PROPOFOL 10 MG/ML EMULSION: Performed by: ANESTHESIOLOGY

## 2023-06-06 RX ORDER — ONDANSETRON 2 MG/ML
4 INJECTION INTRAMUSCULAR; INTRAVENOUS ONCE AS NEEDED
Status: DISCONTINUED | OUTPATIENT
Start: 2023-06-06 | End: 2023-06-06 | Stop reason: HOSPADM

## 2023-06-06 RX ORDER — LIDOCAINE HYDROCHLORIDE 20 MG/ML
INJECTION, SOLUTION INFILTRATION; PERINEURAL AS NEEDED
Status: DISCONTINUED | OUTPATIENT
Start: 2023-06-06 | End: 2023-06-06 | Stop reason: SURG

## 2023-06-06 RX ORDER — MAGNESIUM HYDROXIDE 1200 MG/15ML
LIQUID ORAL AS NEEDED
Status: DISCONTINUED | OUTPATIENT
Start: 2023-06-06 | End: 2023-06-06 | Stop reason: HOSPADM

## 2023-06-06 RX ORDER — PROPOFOL 10 MG/ML
VIAL (ML) INTRAVENOUS AS NEEDED
Status: DISCONTINUED | OUTPATIENT
Start: 2023-06-06 | End: 2023-06-06 | Stop reason: SURG

## 2023-06-06 RX ORDER — PROPOFOL 10 MG/ML
VIAL (ML) INTRAVENOUS CONTINUOUS PRN
Status: DISCONTINUED | OUTPATIENT
Start: 2023-06-06 | End: 2023-06-06 | Stop reason: SURG

## 2023-06-06 RX ORDER — SODIUM CHLORIDE, SODIUM LACTATE, POTASSIUM CHLORIDE, CALCIUM CHLORIDE 600; 310; 30; 20 MG/100ML; MG/100ML; MG/100ML; MG/100ML
1000 INJECTION, SOLUTION INTRAVENOUS CONTINUOUS
Status: DISCONTINUED | OUTPATIENT
Start: 2023-06-06 | End: 2023-06-06 | Stop reason: HOSPADM

## 2023-06-06 RX ORDER — SODIUM CHLORIDE 0.9 % (FLUSH) 0.9 %
10 SYRINGE (ML) INJECTION AS NEEDED
Status: DISCONTINUED | OUTPATIENT
Start: 2023-06-06 | End: 2023-06-06 | Stop reason: HOSPADM

## 2023-06-06 RX ORDER — LIDOCAINE HYDROCHLORIDE 10 MG/ML
0.5 INJECTION, SOLUTION INFILTRATION; PERINEURAL ONCE AS NEEDED
Status: DISCONTINUED | OUTPATIENT
Start: 2023-06-06 | End: 2023-06-06 | Stop reason: HOSPADM

## 2023-06-06 RX ADMIN — PROPOFOL 100 MG: 10 INJECTION, EMULSION INTRAVENOUS at 12:54

## 2023-06-06 RX ADMIN — Medication 200 MCG/KG/MIN: at 12:55

## 2023-06-06 RX ADMIN — SODIUM CHLORIDE, POTASSIUM CHLORIDE, SODIUM LACTATE AND CALCIUM CHLORIDE 1000 ML: 600; 310; 30; 20 INJECTION, SOLUTION INTRAVENOUS at 12:35

## 2023-06-06 RX ADMIN — LIDOCAINE HYDROCHLORIDE 50 MG: 20 INJECTION, SOLUTION INFILTRATION; PERINEURAL at 12:54

## 2023-06-06 NOTE — ANESTHESIA PREPROCEDURE EVALUATION
Anesthesia Evaluation     Patient summary reviewed and Nursing notes reviewed                Airway   Mallampati: II  Dental - normal exam     Pulmonary - normal exam   (+) pneumonia ,  (-) not a smoker  Cardiovascular - normal exam    (+) hyperlipidemia  (-) hypertension, past MI, CAD      Neuro/Psych  (+) numbness  GI/Hepatic/Renal/Endo    (+) renal disease CRI    Musculoskeletal     Abdominal    Substance History      OB/GYN          Other          Other Comment: HIV infection                  Anesthesia Plan    ASA 2     MAC       Anesthetic plan, risks, benefits, and alternatives have been provided, discussed and informed consent has been obtained with: patient.      CODE STATUS:

## 2023-06-06 NOTE — BRIEF OP NOTE
COLONOSCOPY  Progress Note    Brendan Vickers  6/6/2023    Pre-op Diagnosis:   Personal history of colonic polyps [Z86.010]       Post-Op Diagnosis Codes:     * Personal history of colonic polyps [Z86.010]     * Colon polyp [K63.5]    Procedure/CPT® Codes:        Procedure(s):  COLONOSCOPY TO CECUM WITH POLYPECTOMY              Surgeon(s):  Christophe Ruggiero MD    Anesthesia: Monitored Anesthesia Care    Staff:   Endo Technician: Dick Jain  Endo Nurse: Kimberlyn Bello RN         Estimated Blood Loss: none    Urine Voided: * No values recorded between 6/6/2023 12:48 PM and 6/6/2023  1:18 PM *    Specimens:                Specimens       ID Source Type Tests Collected By Collected At Frozen?    A Large Intestine, Right / Ascending Colon Polyp TISSUE PATHOLOGY EXAM   Christophe Ruggiero MD 6/6/23 1303 No    Description: ascending colon polyp    B Large Intestine, Sigmoid Colon Polyp TISSUE PATHOLOGY EXAM   Christophe Rgugiero MD 6/6/23 1311 No    Description: sigmoid colon polyp                  Drains: * No LDAs found *    Findings: Colon to TI good Prep  [id number]        Complications: None          Christophe Ruggiero MD     Date: 6/6/2023  Time: 13:19 EDT

## 2023-06-06 NOTE — H&P
Patient Care Team:  Glenn Youngblood DO as PCP - General (Internal Medicine)  Amira Wu MD as Consulting Physician (Infectious Diseases)  Susan Trinidad MD as Consulting Physician (Dermatology)  Christophe Ruggiero MD as Consulting Physician (Gastroenterology)    CHIEF COMPLAINT: Personal hx colon polyps    HISTORY OF PRESENT ILLNESS:  Last exam was 2018    Past Medical History:   Diagnosis Date    Colon polyp 06/04/2018    Transverse Colon Polyp x 2: Fragments of tubular adenoma, Repeat in 5 years, Dr. Ruggiero    ED (erectile dysfunction)     Erectile dysfunction     HIV disease     Hyperlipidemia     Lipoma     UPPER BACK    Pneumocystis jiroveci pneumonia     Stage 3a chronic kidney disease      Past Surgical History:   Procedure Laterality Date    BACK SURGERY  2006    Dr. Celestin for ruptured disc lumbar spine    COLONOSCOPY N/A 06/04/2018    Transverse Colon Polyp x 2: Fragments of tubular adenoma, Repeat in 5 years, Dr. Ruggiero    EXCISION MASS TRUNK N/A 11/11/2019    Procedure: Excision of soft tissue neoplasm of upper back;  Surgeon: Efe Lopez Jr., MD;  Location: [facility];  Service: General     Family History   Problem Relation Age of Onset    Diabetes Mother     Heart attack Mother     Breast cancer Mother     Colon cancer Father 46    Colon polyps Brother     Diabetes Maternal Grandmother     Malig Hyperthermia Neg Hx      Social History     Tobacco Use    Smoking status: Never    Smokeless tobacco: Never   Substance Use Topics    Alcohol use: No    Drug use: Never     Medications Prior to Admission   Medication Sig Dispense Refill Last Dose    atorvastatin (LIPITOR) 20 MG tablet Take 1 tablet by mouth Every Other Day. 90 tablet 1     Biktarvy -25 MG per tablet Take 1 tablet by mouth Every Night. 90 tablet 1     fluticasone (FLONASE) 50 MCG/ACT nasal spray 2 sprays into the nostril(s) as directed by provider Daily As Needed for Rhinitis.       Multiple  "Vitamins-Minerals (MULTIVITAMIN PO) Take 1 tablet by mouth Daily. PT HOLDING FOR SURGERY       sildenafil (Viagra) 100 MG tablet Take 1 tablet by mouth Daily As Needed for Erectile Dysfunction. TO HOLD 3 TO 5 DAYS BEFORE SURGERY 30 tablet 1      Allergies:  Patient has no known allergies.    REVIEW OF SYSTEMS:  Please see the above history of present illness for pertinent positives and negatives.  The remainder of the patient's systems have been reviewed and are negative.     Vital Signs  Temp:  [98.4 °F (36.9 °C)] 98.4 °F (36.9 °C)  Heart Rate:  [70] 70  Resp:  [16] 16  BP: (128)/(83) 128/83    Flowsheet Rows      Flowsheet Row First Filed Value   Admission Height 182.9 cm (72\") Documented at 06/05/2023 1123   Admission Weight 86.2 kg (190 lb) Documented at 06/05/2023 1123             Physical Exam:  Physical Exam   Constitutional: Patient appears well-developed and well-nourished and in no acute distress   HEENT:   Head: Normocephalic and atraumatic.   Eyes:  Pupils are equal, round, and reactive to light. EOM are intact. Sclerae are anicteric and non-injected.  Mouth and Throat: Patient has moist mucous membranes. Oropharynx is clear of any erythema or exudate.     Neck: Neck supple. No JVD present. No thyromegaly present. No lymphadenopathy present.  Cardiovascular: Regular rate, regular rhythm, S1 normal and S2 normal.  Exam reveals no gallop and no friction rub.  No murmur heard.  Pulmonary/Chest: Lungs are clear to auscultation bilaterally. No respiratory distress. No wheezes. No rhonchi. No rales.   Abdominal: Soft. Bowel sounds are normal. No distension and no mass. There is no hepatosplenomegaly. There is no tenderness.   Musculoskeletal: Normal Muscle tone  Extremities: No edema. Pulses are palpable in all 4 extremities.  Neurological: Patient is alert and oriented to person, place, and time. Cranial nerves II-XII are grossly intact with no focal deficits.  Skin: Skin is warm. No rash noted. Nails show no " clubbing.  No cyanosis or erythema.    Debilities/Disabilities Identified: None  Emotional Behavior: Appropriate     Results Review:   I reviewed the patient's new clinical results.    Lab Results (most recent)       None            Imaging Results (Most Recent)       None          reviewed    ECG/EMG Results (most recent)       None          reviewed    Assessment & Plan   Personal hx colon polyps/  colonoscopy      I discussed the patient's findings and my recommendations with patient.     Christophe Ruggiero MD  06/06/23  12:19 EDT    Time: 10 min prior to procedure.

## 2023-06-06 NOTE — DISCHARGE INSTRUCTIONS
ENDOSCOPY - EGD/COLONOSCOPY       ADULT CARE DISCHARGE  INSTRUCTIONS     Symptoms you may temporarily experience:      Sore Throat     Hoarseness     Bloating/Cramping     Dizziness     IV Irritation/tenderness     Gas or Belching     Slight fever     Small amount of blood in vomit or stool       Call Your Doctor for the following Problems: ______________________     ________________     Fever of 101 degrees or higher       Sharp abdominal  pain     Red streak up the arm from the IV site     Severe cramping        Large amount of blood in stool or vomit       Instructions for the next 24 hours after your Procedure:     Adult supervision     Do NOT drink any alcohol      Do not work today     NO important decisions     DO NOT sign any legal documents     You may shower/ bathe       DO NOT  DRIVE or operate machinery     Resume normal activity tomorrow       Discharge  Diet:     Avoid spicy/ greasy foods     Avoid any food that will cause more gas or bloating       *** Seek IMMEDIATE medical attention and call 911 if you develop symptoms such as:     Chest pain     Shortness of breath     Severe bleeding

## 2023-06-06 NOTE — ANESTHESIA POSTPROCEDURE EVALUATION
"Patient: Brendan Vickers    Procedure Summary       Date: 06/06/23 Room / Location: [facility]    Anesthesia Start: 1248 Anesthesia Stop: 1318    Procedure: COLONOSCOPY TO CECUM WITH POLYPECTOMY Diagnosis:       Personal history of colonic polyps      Colon polyp      (Personal history of colonic polyps [Z86.010])    Surgeons: Christopeh Ruggiero MD Provider: Alea Gray MD    Anesthesia Type: MAC ASA Status: 2            Anesthesia Type: MAC    Vitals  Vitals Value Taken Time   /77 06/06/23 1345   Temp 36.9 °C (98.4 °F) 06/06/23 1320   Pulse 56 06/06/23 1345   Resp 16 06/06/23 1345   SpO2 99 % 06/06/23 1345           Post Anesthesia Care and Evaluation    Patient location during evaluation: PHASE II  Patient participation: complete - patient participated  Level of consciousness: awake  Pain management: adequate    Airway patency: patent  Anesthetic complications: No anesthetic complications    Cardiovascular status: acceptable  Respiratory status: acceptable  Hydration status: acceptable    Comments: /77   Pulse 56   Temp 36.9 °C (98.4 °F) (Temporal)   Resp 16   Ht 182.9 cm (72\")   Wt 84.8 kg (187 lb)   SpO2 99%   BMI 25.36 kg/m²     "

## 2023-06-07 LAB
LAB AP CASE REPORT: NORMAL
PATH REPORT.FINAL DX SPEC: NORMAL
PATH REPORT.GROSS SPEC: NORMAL

## 2023-08-21 ENCOUNTER — LAB (OUTPATIENT)
Dept: LAB | Facility: HOSPITAL | Age: 58
End: 2023-08-21
Payer: COMMERCIAL

## 2023-08-21 DIAGNOSIS — Z21 ASYMPTOMATIC HIV INFECTION, WITH NO HISTORY OF HIV-RELATED ILLNESS: ICD-10-CM

## 2023-08-21 PROCEDURE — 87536 HIV-1 QUANT&REVRSE TRNSCRPJ: CPT

## 2023-08-21 PROCEDURE — 36415 COLL VENOUS BLD VENIPUNCTURE: CPT

## 2023-08-22 LAB
HIV1 RNA # SERPL NAA+PROBE: 160 COPIES/ML
HIV1 RNA SERPL NAA+PROBE-LOG#: 2.2 LOG10COPY/ML

## 2023-10-09 RX ORDER — SILDENAFIL 100 MG/1
100 TABLET, FILM COATED ORAL DAILY PRN
Qty: 10 TABLET | Refills: 5 | Status: SHIPPED | OUTPATIENT
Start: 2023-10-09

## 2023-10-11 NOTE — DISCHARGE INSTRUCTIONS
Dr. Efe Lopez  [address]  [place], [place] [address] [phone]    Discharge Instructions for Minor Surgery      1. Go home, rest and take it easy today; however, you should get up and move about several times today to reduce the risk of developing a clot in your legs.      2. You may experience some dizziness or memory loss from the anesthesia.  This may last for the next 24 hours.  Someone should plan on staying with you for the first 24 hours for your safety.    3. Do not make any important legal decisions or sign any legal papers for the next 24 hours.      4. Eat and drink lightly today.  Start off with liquids, jello, soup, crackers or other bland foods at first. You may advance your diet tomorrow as tolerated as long as you do not experience any nausea or vomiting.     5. You may remove your outer dressings in 2 days.  The white tapes called steri-strips should stay in place.  They will fall off on their own in 1-2 weeks.  Do not worry if they come off sooner.      6. You may notice some bleeding/drainage on your outer dressings. A little bloody drainage is normal. If the bleeding/drainage is such that the bandage cannot absorb it, remove the dressing, apply clean gauze and apply firm pressure for a full 15 minutes.  If the bleeding continues, please call me.    7. You may shower tomorrow.  No tub baths until your incisions are completely healed.         8. You have received a prescription for a narcotic pain medicine, as you will have some pain following surgery.   You will not be totally pain free, but your pain medicine should make the pain tolerable.  Please take your pain medicine as prescribed and always take your pills with food to prevent nausea. If you are having severe pain that cannot be controlled by the pain medicine, please contact me.      9. It is not unusual to experience pain/discomfort in your shoulders or under your ribs after surgery.  It is from the gas used during  This SW had arranged ride for pt for this am appt; SW left VM yesterday, which pt has shown he does monitor. SW called pt when ride was at his home and got vm, asked that pt call SW if any problem getting said ride. At about 3 hours later, this SW has not heard from pt. Team notified.   the laparoscopic procedure and usually lasts 1-3 days.  The prescription pain medicine is used to treat the surgical pain and does not typically alleviate this “gassy” pain.     10. No driving for 24 hours and for as long as you are taking your prescription pain medicine.      11. You will need to call the office at [phone] to schedule a follow-up appointment in 2 weeks.     12. Remember to contact me for any of the following:    • Fever > 101 degrees  • Severe pain that cannot be controlled by taking your pain pills  • Severe nausea or vomiting   • Significant bleeding of your incisions  • Drainage that has a bad smell or is yellow or green in appearance  • Any other questions or concerns      SEDATION DISCHARGE INSTRUCTIONS.    IMPORTANT: The following information will help you return to your best level of health.  Sedation.  You have had a procedure that called for some medicine to reduce anxiety and pain. This medicine (or medicines) is called  sedation. After receiving the medicine, you may be sleepy, but able to breathe on your own. The effects of the medicine may last for several hours.    Follow these instructions after sedation:   Go right home. Rest quietly at home today, then you can be up and about.   Do not drink alcohol, drive or operate machinery for 24 hours.   Do not do anything where light-headedness or clumsiness would be dangerous.   Do not make important decisions or sign any legal papers for the next 24 hours.   Make sure A RESPONSIBLE PERSON stays with you the rest of today and overnight for your protection and safety.   Start your diet with fluids and light foods (jello, soup, juice, toast). Then, slowly progress to your usual diet if you are not sick to your stomach.  · If you have sleep apnea, surgery and certain medicines can increase your risk for breathing problems. Follow instructions from your health care provider about wearing your sleep device:  ? Anytime you are sleeping, including  during daytime naps.  ? While taking prescription pain medicines, sleeping medicines, or medicines that make you drowsy.      Call your doctor if you have:   a gray or blue skin color.   excess sleepiness.   repeated vomiting.   trouble breathing.   any new problems or concerns.

## 2023-10-17 ENCOUNTER — HOSPITAL ENCOUNTER (OUTPATIENT)
Dept: BONE DENSITY | Facility: HOSPITAL | Age: 58
Discharge: HOME | End: 2023-10-17
Admitting: STUDENT IN AN ORGANIZED HEALTH CARE EDUCATION/TRAINING PROGRAM
Payer: COMMERCIAL

## 2023-10-17 PROCEDURE — 77080 DXA BONE DENSITY AXIAL: CPT

## 2023-11-03 ENCOUNTER — OFFICE VISIT (OUTPATIENT)
Dept: INTERNAL MEDICINE | Facility: CLINIC | Age: 58
End: 2023-11-03
Payer: COMMERCIAL

## 2023-11-03 VITALS
OXYGEN SATURATION: 98 % | WEIGHT: 195 LBS | HEIGHT: 72 IN | HEART RATE: 64 BPM | TEMPERATURE: 97.9 F | BODY MASS INDEX: 26.41 KG/M2 | SYSTOLIC BLOOD PRESSURE: 120 MMHG | DIASTOLIC BLOOD PRESSURE: 80 MMHG

## 2023-11-03 DIAGNOSIS — H00.011 HORDEOLUM EXTERNUM OF RIGHT UPPER EYELID: Primary | ICD-10-CM

## 2023-11-03 PROCEDURE — 99212 OFFICE O/P EST SF 10 MIN: CPT | Performed by: STUDENT IN AN ORGANIZED HEALTH CARE EDUCATION/TRAINING PROGRAM

## 2023-11-03 NOTE — PROGRESS NOTES
"  Glenn Youngblood D.O.  Internal Medicine  [facility]  [address], [address]  [place] [address]  [phone]      Chief Complaint  Blepharitis (X 2 days)    SUBJECTIVE    History of Present Illness    Brendan Vickers is a 58 y.o. male who presents to the office today as an established patient that last saw me on 6/26/2023.     Right eye swelling: states 2 days ago he noticed some redness and swelling of the upper eyelid . He does swim and is wondering if this has something to do with it.   No drainage from the eye. There is some tenderness when touching the spot. Denies fever or chills.     No Known Allergies     Outpatient Medications Marked as Taking for the 11/3/23 encounter (Office Visit) with Glenn Youngblood, DO   Medication Sig Dispense Refill    atorvastatin (LIPITOR) 20 MG tablet Take 1 tablet by mouth Every Other Day. 90 tablet 1    Biktarvy -25 MG per tablet TAKE 1 TABLET BY MOUTH EVERY NIGHT 90 tablet 1    fluticasone (FLONASE) 50 MCG/ACT nasal spray 2 sprays into the nostril(s) as directed by provider Daily As Needed for Rhinitis.      sildenafil (VIAGRA) 100 MG tablet TAKE 1 TABLET BY MOUTH DAILY AS NEEDED FOR ERECTILE DYSFUNCTION. 10 tablet 5        Past Medical History:   Diagnosis Date    Colon polyp 06/04/2018    Transverse Colon Polyp x 2: Fragments of tubular adenoma, Repeat in 5 years, Dr. Ruggiero    ED (erectile dysfunction)     Erectile dysfunction     HIV disease     Hyperlipidemia     Lipoma     UPPER BACK    Pneumocystis jiroveci pneumonia        OBJECTIVE    Vital Signs:   /80   Pulse 64   Temp 97.9 °F (36.6 °C) (Infrared)   Ht 182.9 cm (72\")   Wt 88.5 kg (195 lb)   SpO2 98%   BMI 26.45 kg/m²     Physical Exam  Vitals reviewed.   Constitutional:       General: He is not in acute distress.     Appearance: He is not ill-appearing.   Eyes:      Conjunctiva/sclera: Conjunctivae normal.        Comments: Over the right eye there is slight soft tissue " redness and swelling in the encircled area. When the upper eyelid is lifted, a small pinpoint tender pustule is visualized on the inferior aspect of the eyelid. There is no additional swelling or redness.    Pulmonary:      Effort: Pulmonary effort is normal. No respiratory distress.   Skin:     Coloration: Skin is not jaundiced.   Neurological:      Mental Status: He is alert.   Psychiatric:         Mood and Affect: Mood normal.         Behavior: Behavior normal.         Thought Content: Thought content normal.                             ASSESSMENT & PLAN     Diagnoses and all orders for this visit:    1. Hordeolum externum of right upper eyelid (Primary)  -pt presents for acute care visit with signs and symptoms of stye  -discussed mechanism of stye and preventive measures including eyelid scrub   -overall discussed that styes usually resolve over several weeks; he can use over the counter STYE ointment for symptomatic relief and can also apply a warm compress 4 times daily for 10 minutes each.   -advised him to watch out for symptoms of redness/swelling of the remainder of the eyelid or face as this could represent worsening infection and he would need to report back asap for that             The following social determinates of health impact the patient's medical decision making: No social determinates of health were factored in to today's visit.     Follow Up  No follow-ups on file.    Patient/family had no further questions at this time and verbalized understanding of the plan discussed today.

## 2023-12-05 ENCOUNTER — LAB (OUTPATIENT)
Dept: LAB | Facility: HOSPITAL | Age: 58
End: 2023-12-05
Payer: COMMERCIAL

## 2023-12-05 ENCOUNTER — OFFICE VISIT (OUTPATIENT)
Dept: INFECTIOUS DISEASES | Facility: CLINIC | Age: 58
End: 2023-12-05
Payer: COMMERCIAL

## 2023-12-05 ENCOUNTER — TRANSCRIBE ORDERS (OUTPATIENT)
Dept: LAB | Facility: HOSPITAL | Age: 58
End: 2023-12-05
Payer: COMMERCIAL

## 2023-12-05 VITALS
SYSTOLIC BLOOD PRESSURE: 155 MMHG | HEART RATE: 74 BPM | TEMPERATURE: 97.3 F | DIASTOLIC BLOOD PRESSURE: 94 MMHG | BODY MASS INDEX: 26.33 KG/M2 | WEIGHT: 194.4 LBS | RESPIRATION RATE: 18 BRPM | HEIGHT: 72 IN

## 2023-12-05 DIAGNOSIS — B20 CURRENTLY ASYMPTOMATIC HIV INFECTION, WITH HISTORY OF HIV-RELATED ILLNESS: ICD-10-CM

## 2023-12-05 DIAGNOSIS — Z21 ASYMPTOMATIC HIV INFECTION, WITH NO HISTORY OF HIV-RELATED ILLNESS: Primary | ICD-10-CM

## 2023-12-05 DIAGNOSIS — N18.2 CHRONIC KIDNEY DISEASE, STAGE II (MILD): ICD-10-CM

## 2023-12-05 DIAGNOSIS — Z79.2 LONG TERM (CURRENT) USE OF ANTIBIOTICS: ICD-10-CM

## 2023-12-05 DIAGNOSIS — N18.2 CHRONIC KIDNEY DISEASE, STAGE II (MILD): Primary | ICD-10-CM

## 2023-12-05 LAB
ALBUMIN SERPL-MCNC: 4.2 G/DL (ref 3.5–5.2)
ALBUMIN UR-MCNC: <1.2 MG/DL
ALBUMIN/GLOB SERPL: 1.4 G/DL
ALP SERPL-CCNC: 75 U/L (ref 39–117)
ALT SERPL W P-5'-P-CCNC: 35 U/L (ref 1–41)
ANION GAP SERPL CALCULATED.3IONS-SCNC: 11.3 MMOL/L (ref 5–15)
AST SERPL-CCNC: 26 U/L (ref 1–40)
BILIRUB SERPL-MCNC: 0.3 MG/DL (ref 0–1.2)
BILIRUB UR QL STRIP: NEGATIVE
BUN SERPL-MCNC: 15 MG/DL (ref 8–23)
BUN/CREAT SERPL: 11.4 (ref 7–25)
CALCIUM SPEC-SCNC: 9.2 MG/DL (ref 8.6–10.5)
CHLORIDE SERPL-SCNC: 103 MMOL/L (ref 98–107)
CLARITY UR: CLEAR
CO2 SERPL-SCNC: 26.7 MMOL/L (ref 22–29)
COLOR UR: YELLOW
CREAT SERPL-MCNC: 1.32 MG/DL (ref 0.76–1.27)
CREAT UR-MCNC: 56.8 MG/DL
DEPRECATED RDW RBC AUTO: 40.8 FL (ref 37–54)
EGFRCR SERPLBLD CKD-EPI 2021: 61.7 ML/MIN/1.73
ERYTHROCYTE [DISTWIDTH] IN BLOOD BY AUTOMATED COUNT: 12.5 % (ref 12.3–15.4)
GLOBULIN UR ELPH-MCNC: 3.1 GM/DL
GLUCOSE SERPL-MCNC: 113 MG/DL (ref 65–99)
GLUCOSE UR STRIP-MCNC: NEGATIVE MG/DL
HCT VFR BLD AUTO: 50.3 % (ref 37.5–51)
HGB BLD-MCNC: 16.9 G/DL (ref 13–17.7)
HGB UR QL STRIP.AUTO: NEGATIVE
KETONES UR QL STRIP: NEGATIVE
LEUKOCYTE ESTERASE UR QL STRIP.AUTO: NEGATIVE
MCH RBC QN AUTO: 30.3 PG (ref 26.6–33)
MCHC RBC AUTO-ENTMCNC: 33.6 G/DL (ref 31.5–35.7)
MCV RBC AUTO: 90.3 FL (ref 79–97)
MICROALBUMIN/CREAT UR: NORMAL MG/G{CREAT}
NITRITE UR QL STRIP: NEGATIVE
PH UR STRIP.AUTO: 7 [PH] (ref 5–8)
PHOSPHATE SERPL-MCNC: 2.6 MG/DL (ref 2.5–4.5)
PLATELET # BLD AUTO: 248 10*3/MM3 (ref 140–450)
PMV BLD AUTO: 10.1 FL (ref 6–12)
POTASSIUM SERPL-SCNC: 4.3 MMOL/L (ref 3.5–5.2)
PROT SERPL-MCNC: 7.3 G/DL (ref 6–8.5)
PROT UR QL STRIP: NEGATIVE
RBC # BLD AUTO: 5.57 10*6/MM3 (ref 4.14–5.8)
SODIUM SERPL-SCNC: 141 MMOL/L (ref 136–145)
SP GR UR STRIP: 1.01 (ref 1–1.03)
UROBILINOGEN UR QL STRIP: NORMAL
WBC NRBC COR # BLD AUTO: 6.01 10*3/MM3 (ref 3.4–10.8)

## 2023-12-05 PROCEDURE — 81003 URINALYSIS AUTO W/O SCOPE: CPT

## 2023-12-05 PROCEDURE — 87536 HIV-1 QUANT&REVRSE TRNSCRPJ: CPT | Performed by: INTERNAL MEDICINE

## 2023-12-05 PROCEDURE — 84100 ASSAY OF PHOSPHORUS: CPT

## 2023-12-05 PROCEDURE — 86361 T CELL ABSOLUTE COUNT: CPT | Performed by: INTERNAL MEDICINE

## 2023-12-05 PROCEDURE — 85027 COMPLETE CBC AUTOMATED: CPT

## 2023-12-05 PROCEDURE — 82570 ASSAY OF URINE CREATININE: CPT

## 2023-12-05 PROCEDURE — 80053 COMPREHEN METABOLIC PANEL: CPT | Performed by: INTERNAL MEDICINE

## 2023-12-05 PROCEDURE — 99214 OFFICE O/P EST MOD 30 MIN: CPT | Performed by: INTERNAL MEDICINE

## 2023-12-05 PROCEDURE — 82043 UR ALBUMIN QUANTITATIVE: CPT

## 2023-12-05 PROCEDURE — 36415 COLL VENOUS BLD VENIPUNCTURE: CPT | Performed by: INTERNAL MEDICINE

## 2023-12-05 NOTE — PROGRESS NOTES
" cc: Here for HIV    Per initial note in July 2021: Patient reports that he was in his usual state of health until approximately 2007 when he he became ill.  He actually required hospitalization at things and around January 2008 for pneumocystis pneumonia at which time he was diagnosed with HIV.  He had very low T-cell counts.  He thinks he was started on Combivir and boosted Reyataz but was quickly switched to a Atripla.  He had vivid dreams with a Atripla and was switched to Biktarvy around 2018 or 2019.  Has done really well with the Biktarvy.  No side effects or missed doses.  Remains asymptomatic from HIV he has been undetectable for years.  His CD4 count at last check was in the 900s.  He had been following with Dr Wu, but is transferring to our clinic due to her MCC.\"    At last visit in July 2023 he low level viremia  Since that visit has since been asymptomatic from HIV.  He denies any issues.   No side effects or missed doses from Biktarvy  Only other med is statin and prn sildenafil     PMH: HIV, HLD, back surgery  NKA  FMH: no 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 No t/e/d.  He enjoys travel and swimming.  He is in a monogamous relationship and they do not use protection during sexual activity      Review of Systems: All other reviewed and negative except as per HPI    There were no vitals taken for this visit.  GENERAL: Awake and alert, in no acute distress.   HEENT:  Hearing is grossly normal.   Pulm effort normal  PSYCHIATRIC: Appropriate mood, affect, insight, and judgment.       DIAGNOSTICS:  HIV RNA (GT=??? )    --1/21 <20  --7/12/21 <20  --1/22 <20  --7/22 580  --7/22 1230  --8/22 50  --1/2023 <20  --7/19/2023 70  --8/21/2023 160      CD4  --7/12/21 945/45%  --7/22 1068/45%  --1/2023 984/43%  --7/19/2023 1030/45%    Hepatitis  --HAV 7/12/21 Ab+  --HBV 7/12/21 sAB+  --HCV NR 7/12/21, 6/23    STI  --RPR NR 7/12/21, 7/22, 7/23  --Urine GC NR 7/12/21, 7/22, " 6/22, 7/23               Assessment and Plan  Asymptomatic HIV infection, with history of HIV-related illness (CMS/HCC)  Long term (current) use of antibiotics  Hypertension, perhaps component of whitecoat hypertension, he will maintain BP log    He is doing very well on Biktarvy and will cont.  HIV viral load today along with CD4 and CMP  Discussed with him current management of low level viremia typically of limited clinical significance and recommendation is to maintain current ART regimens.  Hopefully will be undetectable this time around.

## 2023-12-06 LAB
BASOPHILS # BLD AUTO: 0 X10E3/UL (ref 0–0.2)
BASOPHILS NFR BLD AUTO: 1 %
CD3+CD4+ CELLS # BLD: 952 /UL (ref 359–1519)
CD3+CD4+ CELLS NFR BLD: 47.6 % (ref 30.8–58.5)
EOSINOPHIL # BLD AUTO: 0.1 X10E3/UL (ref 0–0.4)
EOSINOPHIL NFR BLD AUTO: 2 %
ERYTHROCYTE [DISTWIDTH] IN BLOOD BY AUTOMATED COUNT: 12.5 % (ref 11.6–15.4)
HCT VFR BLD AUTO: 50.2 % (ref 37.5–51)
HGB BLD-MCNC: 17.5 G/DL (ref 13–17.7)
HIV1 RNA # SERPL NAA+PROBE: <20 COPIES/ML
HIV1 RNA SERPL NAA+PROBE-LOG#: NORMAL LOG10COPY/ML
IMM GRANULOCYTES # BLD AUTO: 0 X10E3/UL (ref 0–0.1)
IMM GRANULOCYTES NFR BLD AUTO: 0 %
LYMPHOCYTES # BLD AUTO: 2 X10E3/UL (ref 0.7–3.1)
LYMPHOCYTES NFR BLD AUTO: 33 %
MCH RBC QN AUTO: 31.9 PG (ref 26.6–33)
MCHC RBC AUTO-ENTMCNC: 34.9 G/DL (ref 31.5–35.7)
MCV RBC AUTO: 92 FL (ref 79–97)
MONOCYTES # BLD AUTO: 0.5 X10E3/UL (ref 0.1–0.9)
MONOCYTES NFR BLD AUTO: 8 %
NEUTROPHILS # BLD AUTO: 3.3 X10E3/UL (ref 1.4–7)
NEUTROPHILS NFR BLD AUTO: 56 %
PLATELET # BLD AUTO: 239 X10E3/UL (ref 150–450)
RBC # BLD AUTO: 5.48 X10E6/UL (ref 4.14–5.8)
WBC # BLD AUTO: 5.9 X10E3/UL (ref 3.4–10.8)

## 2023-12-12 RX ORDER — BICTEGRAVIR SODIUM, EMTRICITABINE, AND TENOFOVIR ALAFENAMIDE FUMARATE 50; 200; 25 MG/1; MG/1; MG/1
1 TABLET ORAL NIGHTLY
Qty: 90 TABLET | Refills: 1 | Status: SHIPPED | OUTPATIENT
Start: 2023-12-12

## 2024-02-23 ENCOUNTER — TELEPHONE (OUTPATIENT)
Dept: INFECTIOUS DISEASES | Facility: CLINIC | Age: 59
End: 2024-02-23
Payer: COMMERCIAL

## 2024-02-23 NOTE — TELEPHONE ENCOUNTER
Patient called stating he is now in the [facility] program and would like to have his prescription for Biktarvy sent to their fax at [facility] at [phone] (or [phone]) instead of the [facility].  I informed Dr. Farr and I then faxed the new prescription for Biktarvy to them and verified harnider Peña at [facility] [phone] that she received the prescription.

## 2024-04-30 RX ORDER — BICTEGRAVIR SODIUM, EMTRICITABINE, AND TENOFOVIR ALAFENAMIDE FUMARATE 50; 200; 25 MG/1; MG/1; MG/1
1 TABLET ORAL NIGHTLY
Qty: 90 TABLET | Refills: 1 | Status: SHIPPED | OUTPATIENT
Start: 2024-04-30

## 2024-05-18 DIAGNOSIS — E78.5 HYPERLIPIDEMIA, UNSPECIFIED HYPERLIPIDEMIA TYPE: Chronic | ICD-10-CM

## 2024-05-20 RX ORDER — ATORVASTATIN CALCIUM 20 MG/1
20 TABLET, FILM COATED ORAL EVERY OTHER DAY
Qty: 45 TABLET | Refills: 3 | Status: SHIPPED | OUTPATIENT
Start: 2024-05-20

## 2024-05-22 ENCOUNTER — TELEPHONE (OUTPATIENT)
Dept: INFECTIOUS DISEASES | Facility: CLINIC | Age: 59
End: 2024-05-22
Payer: COMMERCIAL

## 2024-05-22 NOTE — TELEPHONE ENCOUNTER
Called patient regarding rescheduled appointment. Provider will not be in office on on previous date patient now scheduled 7/9/24 due to limited availability. No answer when called left message with new date and arrival time as well as instructions to call back to confirm or reschedule if needed.

## 2024-06-28 ENCOUNTER — OFFICE VISIT (OUTPATIENT)
Dept: INTERNAL MEDICINE | Facility: CLINIC | Age: 59
End: 2024-06-28
Payer: COMMERCIAL

## 2024-06-28 VITALS
DIASTOLIC BLOOD PRESSURE: 86 MMHG | SYSTOLIC BLOOD PRESSURE: 124 MMHG | BODY MASS INDEX: 25.19 KG/M2 | HEIGHT: 72 IN | WEIGHT: 186 LBS | HEART RATE: 72 BPM | OXYGEN SATURATION: 95 %

## 2024-06-28 DIAGNOSIS — N18.2 STAGE 2 CHRONIC KIDNEY DISEASE: ICD-10-CM

## 2024-06-28 DIAGNOSIS — Z00.00 ANNUAL PHYSICAL EXAM: Primary | ICD-10-CM

## 2024-06-28 DIAGNOSIS — J30.2 SEASONAL ALLERGIES: ICD-10-CM

## 2024-06-28 DIAGNOSIS — Z71.84 TRAVEL ADVICE ENCOUNTER: ICD-10-CM

## 2024-06-28 DIAGNOSIS — E78.5 HYPERLIPIDEMIA, UNSPECIFIED HYPERLIPIDEMIA TYPE: ICD-10-CM

## 2024-06-28 DIAGNOSIS — Z12.5 PROSTATE CANCER SCREENING: ICD-10-CM

## 2024-06-28 LAB
BUN SERPL-MCNC: 11 MG/DL (ref 8–23)
BUN/CREAT SERPL: 8 (ref 7–25)
CALCIUM SERPL-MCNC: 9.5 MG/DL (ref 8.6–10.5)
CHLORIDE SERPL-SCNC: 103 MMOL/L (ref 98–107)
CHOLEST SERPL-MCNC: 95 MG/DL (ref 0–200)
CO2 SERPL-SCNC: 26.9 MMOL/L (ref 22–29)
CREAT SERPL-MCNC: 1.37 MG/DL (ref 0.76–1.27)
EGFRCR SERPLBLD CKD-EPI 2021: 59.1 ML/MIN/1.73
GLUCOSE SERPL-MCNC: 90 MG/DL (ref 65–99)
HDLC SERPL-MCNC: 33 MG/DL (ref 40–60)
LDLC SERPL CALC-MCNC: 42 MG/DL (ref 0–100)
POTASSIUM SERPL-SCNC: 4.4 MMOL/L (ref 3.5–5.2)
PSA SERPL-MCNC: 1.29 NG/ML (ref 0–4)
SODIUM SERPL-SCNC: 140 MMOL/L (ref 136–145)
TRIGL SERPL-MCNC: 104 MG/DL (ref 0–150)
VLDLC SERPL CALC-MCNC: 20 MG/DL (ref 5–40)

## 2024-06-28 PROCEDURE — 99214 OFFICE O/P EST MOD 30 MIN: CPT | Performed by: STUDENT IN AN ORGANIZED HEALTH CARE EDUCATION/TRAINING PROGRAM

## 2024-06-28 PROCEDURE — 99396 PREV VISIT EST AGE 40-64: CPT | Performed by: STUDENT IN AN ORGANIZED HEALTH CARE EDUCATION/TRAINING PROGRAM

## 2024-06-28 RX ORDER — SCOLOPAMINE TRANSDERMAL SYSTEM 1 MG/1
1 PATCH, EXTENDED RELEASE TRANSDERMAL
Qty: 10 EACH | Refills: 0 | Status: SHIPPED | OUTPATIENT
Start: 2024-06-28

## 2024-06-28 RX ORDER — HYDROCORTISONE 25 MG/G
CREAM TOPICAL
Qty: 28 G | Refills: 2 | Status: SHIPPED | OUTPATIENT
Start: 2024-06-28

## 2024-06-28 NOTE — PROGRESS NOTES
"  Glenn Youngblood D.O.  Internal Medicine  [facility]  [address], [address]  [place] [address]  [phone]    Chief Complaint  Annual checkup    HISTORY    Brendan Vickers is a 58 y.o. male who presents to the office today as a  an established patient for their annual preventative exam.      No hospitalization(s) within the last year.     Current exercise regimen: swims daily     Status of chronic medical conditions:    HLD: taking atorvastatin 20 mg every day for primary prevention     Lab Results   Component Value Date    CHLPL 101 06/26/2023    TRIG 160 (H) 06/26/2023    HDL 31 (L) 06/26/2023    LDL 43 06/26/2023       Seasonal allergies: takes flonase nasal spray as needed. States his allergies are more annoying this year or normal. He is taking Xyzal but it makes him sedated. His symptoms are mainly in his eyes \"I want to scratch my eyes out\". He has already tried Pataday over the counter. He states his eye doctor also prescribed a drop which helped some but not entirely.      HIV: Sees Dr Farr, takes Biktarvy, no missed doses. 12/2023 viral load undetectable with a CD4 count 952.     ED: has sildenafil 100 mg at home as needed    Stage 2 chronic kidney disease : following with nephrology, felt to be due to past NSAID use and HIV infection    Hemorrhoids: previously took proctosol HC 2.5% which helped and he is requesting a refill today.    Any other concerns regarding their health today:      Pt states he is going to [place] in September. He is requesting a motion sickness patch.     Health Maintenance Summary            Overdue - BMI FOLLOWUP (Yearly) Overdue since 1/8/2020 01/08/2019  Registry Metric: BMI Follow-up              Overdue - COVID-19 Vaccine (7 - 2023-24 season) Overdue since 11/25/2023 09/30/2023  Imm Admin: COVID-19 F23 (PFIZER) 12YRS+ (COMIRNATY)    09/29/2022  Imm Admin: COVID-19 (PFIZER) BIVALENT 12+YRS    04/12/2022  Imm Admin: Covid-19 " (Pfizer) Gray Cap Monovalent    08/19/2021  Imm Admin: COVID-19 (PFIZER) Purple Cap Monovalent    03/20/2021  Imm Admin: COVID-19 (PFIZER) Purple Cap Monovalent    Only the first 5 history entries have been loaded, but more history exists.              Overdue - LIPID PANEL (Yearly) Order placed this encounter      06/26/2023  Lipid Panel With LDL/HDL Ratio    06/22/2022  Lipid Panel With LDL/HDL Ratio    03/04/2021  Lipid Panel With / Chol / HDL Ratio    02/27/2020  Lipid Panel With / Chol / HDL Ratio    10/29/2018  Lipid Panel With / Chol / HDL Ratio    Only the first 5 history entries have been loaded, but more history exists.              INFLUENZA VACCINE (Yearly - August to March) Next due on 8/1/2024 09/19/2023  Imm Admin: Influenza Injectable Mdck Pf Quad    09/29/2022  Imm Admin: Influenza Injectable Mdck Pf Quad    09/30/2021  Imm Admin: Fluzone (or Fluarix & Flulaval for VFC) >6mos    09/22/2020  Imm Admin: Flu Vaccine Quad PF >36MO    09/22/2020  Imm Admin: Fluzone (or Fluarix & Flulaval for VFC) >6mos    Only the first 5 history entries have been loaded, but more history exists.              ANNUAL PHYSICAL (Yearly) Next due on 6/28/2025 06/28/2024  Done    06/26/2023  Done    06/22/2022  Done    03/11/2021  Done    11/05/2018  Done    Only the first 5 history entries have been loaded, but more history exists.              COLORECTAL CANCER SCREENING (COLONOSCOPY - Every 5 Years) Next due on 6/6/2028 06/06/2023  COLONOSCOPY    06/06/2023  Surgical Procedure: COLONOSCOPY    06/04/2018  SCANNED - COLONOSCOPY    05/01/2013  COLONOSCOPY (Patient-Reported (Performed Externally))    01/01/2013  COLONOSCOPY (Done)    Only the first 5 history entries have been loaded, but more history exists.              Pneumococcal Vaccine 0-64 (4 of 4 - PPSV23 or PCV20) Next due on 8/30/2028 06/22/2022  Imm Admin: Pneumococcal Polysaccharide (PPSV23)    11/01/2016  Imm Admin: Pneumococcal Polysaccharide  (PPSV23)    11/14/2015  Imm Admin: Pneumococcal Conjugate 13-Valent (PCV13)              TDAP/TD VACCINES (2 - Td or Tdap) Next due on 11/5/2028 11/05/2018  Imm Admin: Tdap              Hepatitis B (Series Information) Completed      05/14/2019  Imm Admin: Hepatitis B Adult/Adolescent IM    12/12/2018  Imm Admin: Hepatitis B Adult/Adolescent IM    11/12/2018  Imm Admin: Hepatitis B Adult/Adolescent IM              ZOSTER VACCINE (Series Information) Completed      06/13/2019  Imm Admin: Shingrix    11/28/2018  Imm Admin: Shingrix              Orthopox/Monkeypox (Series Information) Completed      10/22/2022  Imm Admin: Monkeypox/Smallpox ID ([email or web address])    09/20/2022  Imm Admin: Monkeypox/Smallpox ID ([email or web address])              HEPATITIS C SCREENING  Completed      06/26/2023  Hep C Virus Ab component of Hepatitis C antibody    07/13/2022  Hepatitis C Antibody    07/12/2021  Hepatitis C Antibody    03/04/2021  Hep C Virus Ab component of Hepatitis C Antibody    11/05/2018  Patient-Reported (Performed Externally)    Only the first 5 history entries have been loaded, but more history exists.              RSV Vaccine - Adults (Series Information) Completed      09/19/2023  Imm Admin: ABRYSVO (RSV, 60+ or pregnant women 32-36 wks)                     No Known Allergies     Outpatient Medications Marked as Taking for the 6/28/24 encounter (Office Visit) with Glenn Youngblood DO   Medication Sig Dispense Refill    atorvastatin (LIPITOR) 20 MG tablet TAKE 1 TABLET BY MOUTH EVERY OTHER DAY 45 tablet 3    Biktarvy -25 MG per tablet TAKE 1 TABLET BY MOUTH EVERY NIGHT 90 tablet 1    fluticasone (FLONASE) 50 MCG/ACT nasal spray 2 sprays into the nostril(s) as directed by provider Daily As Needed for Rhinitis.      sildenafil (VIAGRA) 100 MG tablet TAKE 1 TABLET BY MOUTH DAILY AS NEEDED FOR ERECTILE DYSFUNCTION. 10 tablet 5       Past Medical History:   Diagnosis Date    CKD (chronic kidney disease), stage II     Colon polyp  06/04/2018    Transverse Colon Polyp x 2: Fragments of tubular adenoma, Repeat in 5 years, Dr. Ruggiero    ED (erectile dysfunction)     Erectile dysfunction     HIV disease     Hyperlipidemia     Lipoma     UPPER BACK    Pneumocystis jiroveci pneumonia      Past Surgical History:   Procedure Laterality Date    BACK SURGERY  2006    Dr. Celestin for ruptured disc lumbar spine    COLONOSCOPY N/A 06/04/2018    Transverse Colon Polyp x 2: Fragments of tubular adenoma, Repeat in 5 years, Dr. Ruggiero    COLONOSCOPY N/A 6/6/2023    Procedure: COLONOSCOPY TO CECUM WITH POLYPECTOMY;  Surgeon: Christophe Ruggiero MD;  Location: [facility] MAIN OR;  Service: Gastroenterology;  Laterality: N/A;  polyps    EXCISION MASS TRUNK N/A 11/11/2019    Procedure: Excision of soft tissue neoplasm of upper back;  Surgeon: Efe Lopez Jr., MD;  Location: [facility] MAIN OR;  Service: General     Family History   Problem Relation Age of Onset    Diabetes Mother     Heart attack Mother     Breast cancer Mother     Colon cancer Father 46    Colon polyps Brother     Diabetes Maternal Grandmother     Malig Hyperthermia Neg Hx     reports that he has never smoked. He has never used smokeless tobacco. He reports that he does not currently use alcohol. He reports that he does not use drugs.    Immunization History   Administered Date(s) Administered    ABRYSVO (RSV, 60+ or pregnant women 32-36 wks) 09/19/2023    COVID-19 (PFIZER) BIVALENT 12+YRS 09/29/2022    COVID-19 (PFIZER) Purple Cap Monovalent 02/27/2021, 03/20/2021, 08/19/2021    COVID-19 F23 (PFIZER) 12YRS+ (COMIRNATY) 09/30/2023    Covid-19 (Pfizer) Gray Cap Monovalent 04/12/2022    Flu Vaccine Quad PF >36MO 10/08/2018, 09/22/2020    FluMist 2-49yrs 10/11/2017    Fluzone (or Fluarix & Flulaval for VFC) >6mos 10/08/2018, 09/08/2019, 09/22/2020, 09/30/2021    Hepatitis A 04/20/2018, 10/20/2018    Hepatitis B Adult/Adolescent IM 11/12/2018, 12/12/2018, 05/14/2019    Influenza  "Injectable Mdck Pf Quad 09/29/2022, 09/19/2023    Monkeypox/Smallpox ID ([email or web address]) 09/20/2022, 10/22/2022    Pneumococcal Conjugate 13-Valent (PCV13) 11/14/2015    Pneumococcal Polysaccharide (PPSV23) 11/01/2016, 06/22/2022    Shingrix 11/28/2018, 06/13/2019    Tdap 11/05/2018        OBJECTIVE    Vital Signs:   /86   Pulse 72   Ht 182.9 cm (72\")   Wt 84.4 kg (186 lb)   SpO2 95%   BMI 25.23 kg/m²     Physical Exam  Vitals reviewed.   Constitutional:       General: He is not in acute distress.     Appearance: Normal appearance. He is not ill-appearing.   HENT:      Head: Normocephalic and atraumatic.      Right Ear: Tympanic membrane, ear canal and external ear normal. There is no impacted cerumen.      Left Ear: Tympanic membrane, ear canal and external ear normal. There is no impacted cerumen.      Mouth/Throat:      Mouth: Mucous membranes are moist.      Pharynx: No oropharyngeal exudate or posterior oropharyngeal erythema.   Eyes:      General: No scleral icterus.        Right eye: No discharge.         Left eye: No discharge.      Extraocular Movements: Extraocular movements intact.      Conjunctiva/sclera: Conjunctivae normal.      Pupils: Pupils are equal, round, and reactive to light.   Cardiovascular:      Rate and Rhythm: Normal rate and regular rhythm.      Heart sounds: Normal heart sounds. No murmur heard.  Pulmonary:      Effort: Pulmonary effort is normal. No respiratory distress.      Breath sounds: Normal breath sounds. No wheezing.   Abdominal:      General: Bowel sounds are normal. There is no distension.      Palpations: Abdomen is soft.      Tenderness: There is no abdominal tenderness. There is no guarding.   Musculoskeletal:      Cervical back: Neck supple.      Right lower leg: No edema.      Left lower leg: No edema.   Lymphadenopathy:      Cervical: No cervical adenopathy.   Skin:     General: Skin is warm and dry.      Coloration: Skin is not jaundiced.   Neurological:      " General: No focal deficit present.      Mental Status: He is alert and oriented to person, place, and time.      Cranial Nerves: No cranial nerve deficit.      Motor: No weakness.   Psychiatric:         Mood and Affect: Mood normal.         Behavior: Behavior normal.         Thought Content: Thought content normal.                             ASSESSMENT & PLAN     Annual Preventative Health Examination  -Age and sex appropriate physical exam performed and documented. Updated past medical, family, social and surgical histories as well as allergies and care team list. Addressed care gaps listed in the medical record.  -Encouraged annual dental and vision exams as part of their overall health.  -Encouraged minimum of 30 minutes or more of exercise at a brisk walk or higher 5 days per week combined with a well-balanced diet.   -Immunizations reviewed and updated in EMR. COVID19 recommended.  -Lipid screening:  Patient is already on statin therapy. See plan below.   -Aspirin for primary or secondary prevention: Not applicable, patient is greater than age 60 and risks outweigh benefits for primary prevention.  -Depression and Anxiety screening: Patient denies symptom of anxiety or depression.  -Diabetes screening: Patient is 35-70 years of age and overweight, screening for diabetes is indicated every 3 years. Screening is up to date.    -Tobacco use screening: Conducted and addressed if indicated.   -Alcohol use screening: Conducted and addressed if indicated.   -Illicit drug screening: Conducted and addressed if indicated.   -Abdominal aortic aneurysm screening: AAA screening is not indicated as patient is less than 65 years of age.  -Hypertension screening: Patient screened negative for HTN today.  -HIV screening: Patient has known HIV infection.   -Hepatitis C virus screening:  Patient has already completed Hepatitis C screening. Negative screening on file.   -Syphilis screening: Syphilis screening not  indicated.  -Hepatitis B virus screening: prefers to get screening at ID  -Colon cancer screening: Patient is already up to date on their colon cancer screening with colonoscopy is indicated again in  6/2028  -Lung cancer screening: Patient has never smoked.  -Prostate cancer screening: will repeat PSA today  Lab Results   Component Value Date    PSA 1.400 06/26/2023    PSA 1.100 03/04/2021    PSA 1.180 02/27/2020     -Bone density testing: normal DEXA 2023      Follow up in 1 year for annual physical exam.    Patient/family had no further questions at this time and verbalized understanding of the plan discussed today.     A problem-based visit was also conducted on the same day, see below for assessment and plan    Diagnoses and all orders for this visit:    1. Hyperlipidemia, unspecified hyperlipidemia type (Primary)  - taking atorvastatin 20 mg every day for primary prevention  Lab Results   Component Value Date    CHLPL 101 06/26/2023    TRIG 160 (H) 06/26/2023    HDL 31 (L) 06/26/2023    LDL 43 06/26/2023     -had great control in 2023, recheck today for continued annual monitoring. Adjust regimen if needed.  -     Lipid Panel    2. Stage 2 chronic kidney disease  -follows with nephrology  Lab Results   Component Value Date    GLUCOSE 113 (H) 12/05/2023    BUN 15 12/05/2023    CREATININE 1.32 (H) 12/05/2023    EGFRRESULT 58.9 (L) 06/26/2023    EGFR 61.7 12/05/2023    BCR 11.4 12/05/2023    K 4.3 12/05/2023    CO2 26.7 12/05/2023    CALCIUM 9.2 12/05/2023    PROTENTOTREF 7.2 06/26/2023    ALBUMIN 4.2 12/05/2023    BILITOT 0.3 12/05/2023    AST 26 12/05/2023    ALT 35 12/05/2023     -I reviewed most recent BMP as above as well as most recent nephrology progress note. Renal function stable. He was advised to avoid NSAIDs.   -     Basic Metabolic Panel    3. Seasonal allergies  -takes flonase nasal spray as needed. States his allergies are more annoying this year or normal. He is taking Xyzal but it makes him  "sedated. His symptoms are mainly in his eyes \"I want to scratch my eyes out\". He has already tried Pataday over the counter. He states his eye doctor also prescribed a drop which helped some but not entirely.   -he is interested in a referral to allergist to discuss additional treatment options and that will be placed today  -     Ambulatory Referral to Allergy    4. Travel advice encounter  -he states he will be traveling internationally soon and would like advice regarding travel medications, nausea medication for a boat ride, any immunization. I referred him to [facility] for advice and prescriptions related to his specific area of travel. I will Rx scopolamine patches for potential motion sickess.   -     Scopolamine 1 MG/3DAYS patch; Place 1 patch on the skin as directed by provider Every 72 (Seventy-Two) Hours.  Dispense: 10 each; Refill: 0                The following social determinates of health impact the patient's medical decision making: No social determinates of health were factored in to today's visit.     Follow Up  Return in about 1 year (around 6/28/2025) for Annual physical.    "

## 2024-07-09 ENCOUNTER — LAB (OUTPATIENT)
Dept: LAB | Facility: HOSPITAL | Age: 59
End: 2024-07-09
Payer: COMMERCIAL

## 2024-07-09 ENCOUNTER — OFFICE VISIT (OUTPATIENT)
Dept: INFECTIOUS DISEASES | Facility: CLINIC | Age: 59
End: 2024-07-09
Payer: COMMERCIAL

## 2024-07-09 VITALS
SYSTOLIC BLOOD PRESSURE: 133 MMHG | TEMPERATURE: 97.3 F | DIASTOLIC BLOOD PRESSURE: 81 MMHG | RESPIRATION RATE: 20 BRPM | HEART RATE: 67 BPM | BODY MASS INDEX: 25.42 KG/M2 | WEIGHT: 187.4 LBS

## 2024-07-09 DIAGNOSIS — Z29.89 NEED FOR MALARIA PROPHYLAXIS: ICD-10-CM

## 2024-07-09 DIAGNOSIS — Z79.2 LONG TERM (CURRENT) USE OF ANTIBIOTICS: ICD-10-CM

## 2024-07-09 DIAGNOSIS — B20 CURRENTLY ASYMPTOMATIC HIV INFECTION, WITH HISTORY OF HIV-RELATED ILLNESS: Primary | Chronic | ICD-10-CM

## 2024-07-09 LAB
ALBUMIN SERPL-MCNC: 4.4 G/DL (ref 3.5–5.2)
ALBUMIN/GLOB SERPL: 1.5 G/DL
ALP SERPL-CCNC: 79 U/L (ref 39–117)
ALT SERPL W P-5'-P-CCNC: 29 U/L (ref 1–41)
ANION GAP SERPL CALCULATED.3IONS-SCNC: 9 MMOL/L (ref 5–15)
AST SERPL-CCNC: 29 U/L (ref 1–40)
BILIRUB SERPL-MCNC: 0.5 MG/DL (ref 0–1.2)
BUN SERPL-MCNC: 15 MG/DL (ref 8–23)
BUN/CREAT SERPL: 11.9 (ref 7–25)
CALCIUM SPEC-SCNC: 9.6 MG/DL (ref 8.6–10.5)
CHLORIDE SERPL-SCNC: 102 MMOL/L (ref 98–107)
CO2 SERPL-SCNC: 29 MMOL/L (ref 22–29)
CREAT SERPL-MCNC: 1.26 MG/DL (ref 0.76–1.27)
EGFRCR SERPLBLD CKD-EPI 2021: 65.3 ML/MIN/1.73
GLOBULIN UR ELPH-MCNC: 3 GM/DL
GLUCOSE SERPL-MCNC: 84 MG/DL (ref 65–99)
HCV AB SER QL: NORMAL
POTASSIUM SERPL-SCNC: 4.3 MMOL/L (ref 3.5–5.2)
PROT SERPL-MCNC: 7.4 G/DL (ref 6–8.5)
RPR SER QL: NORMAL
SODIUM SERPL-SCNC: 140 MMOL/L (ref 136–145)

## 2024-07-09 PROCEDURE — 36415 COLL VENOUS BLD VENIPUNCTURE: CPT | Performed by: INTERNAL MEDICINE

## 2024-07-09 PROCEDURE — 87591 N.GONORRHOEAE DNA AMP PROB: CPT | Performed by: INTERNAL MEDICINE

## 2024-07-09 PROCEDURE — 86361 T CELL ABSOLUTE COUNT: CPT | Performed by: INTERNAL MEDICINE

## 2024-07-09 PROCEDURE — 99214 OFFICE O/P EST MOD 30 MIN: CPT | Performed by: INTERNAL MEDICINE

## 2024-07-09 PROCEDURE — 87536 HIV-1 QUANT&REVRSE TRNSCRPJ: CPT | Performed by: INTERNAL MEDICINE

## 2024-07-09 PROCEDURE — 80053 COMPREHEN METABOLIC PANEL: CPT | Performed by: INTERNAL MEDICINE

## 2024-07-09 PROCEDURE — 86592 SYPHILIS TEST NON-TREP QUAL: CPT | Performed by: INTERNAL MEDICINE

## 2024-07-09 PROCEDURE — 86803 HEPATITIS C AB TEST: CPT | Performed by: INTERNAL MEDICINE

## 2024-07-09 PROCEDURE — 87491 CHLMYD TRACH DNA AMP PROBE: CPT | Performed by: INTERNAL MEDICINE

## 2024-07-09 RX ORDER — ATOVAQUONE AND PROGUANIL HYDROCHLORIDE 250; 100 MG/1; MG/1
1 TABLET, FILM COATED ORAL SEE ADMIN INSTRUCTIONS
Qty: 23 TABLET | Refills: 0 | Status: SHIPPED | OUTPATIENT
Start: 2024-07-09 | End: 2024-07-15 | Stop reason: SDUPTHER

## 2024-07-09 RX ORDER — BICTEGRAVIR SODIUM, EMTRICITABINE, AND TENOFOVIR ALAFENAMIDE FUMARATE 50; 200; 25 MG/1; MG/1; MG/1
1 TABLET ORAL NIGHTLY
Qty: 90 TABLET | Refills: 1 | Status: SHIPPED | OUTPATIENT
Start: 2024-07-09

## 2024-07-09 NOTE — PROGRESS NOTES
" cc: Here for HIV    Per initial note in July 2021: Patient reports that he was in his usual state of health until approximately 2007 when he he became ill.  He actually required hospitalization at things and around January 2008 for pneumocystis pneumonia at which time he was diagnosed with HIV.  He had very low T-cell counts.  He thinks he was started on Combivir and boosted Reyataz but was quickly switched to a Atripla.  He had vivid dreams with a Atripla and was switched to Biktarvy around 2018 or 2019.  Has done really well with the Biktarvy.  No side effects or missed doses.  Remains asymptomatic from HIV he has been undetectable for years.  His CD4 count at last check was in the 900s.  He had been following with Dr Wu, but is transferring to our clinic due to her longterm.\"    At last visit in Dec 2023 he was UD.  He had had some low-level viremia which may have been secondary to multivitamin use and impaired absorption of the Biktarvy.  Since that visit has since been asymptomatic from HIV.  He denies any issues.   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  He enjoys travel and swimming.  He is in a monogamous relationship and they do not use protection during sexual activity      Review of Systems: All other reviewed and negative except as per HPI    Blood pressure 133/81, pulse 67, temperature 97.3 °F (36.3 °C), resp. rate 20, weight 85 kg (187 lb 6.4 oz).  GENERAL: Awake and alert, in no acute distress.   HEENT:  Hearing is grossly normal.   Pulm effort normal  PSYCHIATRIC: Appropriate mood, affect, insight, and judgment.       DIAGNOSTICS:  HIV RNA (GT=??? )    --1/21 <20  --7/12/21 <20  --1/22 <20  --7/22 580  --7/22 1230  --8/22 50  --1/2023 <20  --7/19/2023 70  --8/21/2023 160  --12/5/2023 <20      CD4  --7/12/21 945/45%  --7/22 1068/45%  --1/2023 984/43%  --7/19/2023 " 1030/45%  --12/05/2023 952/48%    Hepatitis  --HAV 7/12/21 Ab+  --HBV 7/12/21 sAB+  --HCV NR 7/12/21, 6/23    STI  --RPR NR 7/12/21, 7/22, 7/23  --Urine GC NR 7/12/21, 7/22, 6/22, 7/23               Assessment and Plan  Asymptomatic HIV infection, with history of HIV-related illness (CMS/HCC)  Long term (current) use of antibiotics  Hypertension, perhaps component of whitecoat hypertension, he will maintain BP log    He is doing very well on Biktarvy and will cont.  HIV viral load today along with CD4 and CMP, annual STI check (RPR, HCV, GC)  He has read about doxycycline postexposure prophylaxis and I will be happy to prescribe if he is interested.  Dose is 200 mg of doxycycline taken within 24 hours of high risk condomless sexual activity.  He is going to think about it    Also has upcoming trip to [place] and [place].  Malaria prophylaxis is recommended and I have prescribed Malarone to be taken 1 to 2 days prior to exposure for 7 days after exposure.      RTC 6mo

## 2024-07-10 LAB
BASOPHILS # BLD AUTO: 0 X10E3/UL (ref 0–0.2)
BASOPHILS NFR BLD AUTO: 0 %
C TRACH RRNA SPEC QL NAA+PROBE: NEGATIVE
CD3+CD4+ CELLS # BLD: 964 /UL (ref 359–1519)
CD3+CD4+ CELLS NFR BLD: 43.8 % (ref 30.8–58.5)
EOSINOPHIL # BLD AUTO: 0.2 X10E3/UL (ref 0–0.4)
EOSINOPHIL NFR BLD AUTO: 3 %
ERYTHROCYTE [DISTWIDTH] IN BLOOD BY AUTOMATED COUNT: 13.3 % (ref 11.6–15.4)
HCT VFR BLD AUTO: 49.4 % (ref 37.5–51)
HGB BLD-MCNC: 17.3 G/DL (ref 13–17.7)
HIV1 RNA # SERPL NAA+PROBE: <20 COPIES/ML
HIV1 RNA SERPL NAA+PROBE-LOG#: NORMAL LOG10COPY/ML
IMM GRANULOCYTES # BLD AUTO: 0 X10E3/UL (ref 0–0.1)
IMM GRANULOCYTES NFR BLD AUTO: 0 %
LYMPHOCYTES # BLD AUTO: 2.2 X10E3/UL (ref 0.7–3.1)
LYMPHOCYTES NFR BLD AUTO: 39 %
MCH RBC QN AUTO: 32.4 PG (ref 26.6–33)
MCHC RBC AUTO-ENTMCNC: 35 G/DL (ref 31.5–35.7)
MCV RBC AUTO: 93 FL (ref 79–97)
MONOCYTES # BLD AUTO: 0.6 X10E3/UL (ref 0.1–0.9)
MONOCYTES NFR BLD AUTO: 10 %
N GONORRHOEA RRNA SPEC QL NAA+PROBE: NEGATIVE
NEUTROPHILS # BLD AUTO: 2.8 X10E3/UL (ref 1.4–7)
NEUTROPHILS NFR BLD AUTO: 48 %
PLATELET # BLD AUTO: 217 X10E3/UL (ref 150–450)
RBC # BLD AUTO: 5.34 X10E6/UL (ref 4.14–5.8)
WBC # BLD AUTO: 5.8 X10E3/UL (ref 3.4–10.8)

## 2024-07-12 NOTE — PROGRESS NOTES
Discussed with patient lab results.  He is undetectable with high CD4 count.    Discussed pros and cons of yellow fever vaccination.  I do not think his HIV would be a contraindication for this, but his age does place him at slightly higher risk adverse events.  He is disinclined to have the vaccine for now and may avoid [place] altogether versus go and use mosquito avoidance techniques such as clothing and repellents.

## 2024-07-15 RX ORDER — ATOVAQUONE AND PROGUANIL HYDROCHLORIDE 250; 100 MG/1; MG/1
1 TABLET, FILM COATED ORAL SEE ADMIN INSTRUCTIONS
Qty: 23 TABLET | Refills: 0 | Status: SHIPPED | OUTPATIENT
Start: 2024-07-15

## 2024-09-26 ENCOUNTER — TELEPHONE (OUTPATIENT)
Dept: INFECTIOUS DISEASES | Facility: CLINIC | Age: 59
End: 2024-09-26
Payer: COMMERCIAL

## 2024-09-27 ENCOUNTER — TELEPHONE (OUTPATIENT)
Dept: INFECTIOUS DISEASES | Facility: CLINIC | Age: 59
End: 2024-09-27
Payer: COMMERCIAL

## 2024-10-21 ENCOUNTER — OFFICE VISIT (OUTPATIENT)
Dept: INFECTIOUS DISEASES | Facility: CLINIC | Age: 59
End: 2024-10-21
Payer: COMMERCIAL

## 2024-10-21 VITALS
HEART RATE: 92 BPM | TEMPERATURE: 97.1 F | RESPIRATION RATE: 20 BRPM | WEIGHT: 189.4 LBS | DIASTOLIC BLOOD PRESSURE: 79 MMHG | SYSTOLIC BLOOD PRESSURE: 128 MMHG | BODY MASS INDEX: 25.69 KG/M2

## 2024-10-21 DIAGNOSIS — B20 CURRENTLY ASYMPTOMATIC HIV INFECTION, WITH HISTORY OF HIV-RELATED ILLNESS: Primary | ICD-10-CM

## 2024-10-21 DIAGNOSIS — Z79.2 LONG TERM (CURRENT) USE OF ANTIBIOTICS: ICD-10-CM

## 2024-10-21 PROCEDURE — 99213 OFFICE O/P EST LOW 20 MIN: CPT | Performed by: INTERNAL MEDICINE

## 2024-10-21 NOTE — PROGRESS NOTES
" cc: Here for HIV    Per initial note in July 2021: Patient reports that he was in his usual state of health until approximately 2007 when he he became ill.  He actually required hospitalization at things and around January 2008 for pneumocystis pneumonia at which time he was diagnosed with HIV.  He had very low T-cell counts.  He thinks he was started on Combivir and boosted Reyataz but was quickly switched to a Atripla.  He had vivid dreams with a Atripla and was switched to Biktarvy around 2018 or 2019.  Has done really well with the Biktarvy.  No side effects or missed doses.  Remains asymptomatic from HIV he has been undetectable for years.  His CD4 count at last check was in the 900s.  He had been following with Dr Wu, but is transferring to our clinic due to her alf.\"    At last visit in July 2023 he was UD.     Since that visit has since been asymptomatic from HIV.     No side effects or missed doses from Biktarvy  Went to [place] this summer and had some bites bites and flights on his bilateral calves.  These were swollen for about a day and painful but healed up by the next day.  No ulcerations.  He was concerned about leishmaniasis so decided to come into clinic.     PMH: HIV, HLD, back surgery  NKA  FMH: no [facility] infectious diseases  SH: MSM. . Lives alone in [place]. No t/e/d.  He enjoys travel and swimming.  He is in a monogamous relationship and they do not use protection during sexual activity      Review of Systems: All other reviewed and negative except as per HPI    Blood pressure 128/79, pulse 92, temperature 97.1 °F (36.2 °C), resp. rate 20, weight 85.9 kg (189 lb 6.4 oz).  GENERAL: Awake and alert, in no acute distress.   HEENT:  Hearing is grossly normal.   Pulm effort normal  PSYCHIATRIC: Appropriate mood, affect, insight, and judgment.       DIAGNOSTICS:  HIV RNA (GT=??? )    --1/21 <20  --7/12/21 <20  --1/22 <20  --7/22 580  --7/22 1230  --8/22 50  --1/2023 <20  --7/19/2023 " 70  --8/21/2023 160  --12/5/2023 <20  --7/9/2024 <20      CD4  --7/12/21 945/45%  --7/22 1068/45%  --1/2023 984/43%  --7/19/2023 1030/45%  --12/05/2023 952/48%  --7/09/2024 964/44%    Hepatitis  --HAV 7/12/21 Ab+  --HBV 7/12/21 sAB+  --HCV NR 7/12/21, 6/23, 7/24    STI  --RPR NR 7/12/21, 7/22, 7/23, 7/24  --Urine GC NR 7/12/21, 7/22, 6/22, 7/23               Assessment and Plan  Asymptomatic HIV infection, with history of HIV-related illness (CMS/HCC)  Long term (current) use of antibiotics     Reassured patient that I do not see any evidence of cutaneous leishmaniasis . He high CD4 counts would not consider him immunosuppressed.  Do not see any role for blood testing or prophylactic antibiotic.  For his HIV, he remains well-controlled on Biktarvy and we will continue.  Will plan to recheck labs at his next scheduled appointment in January 2025    RTC 6mo

## 2025-01-09 ENCOUNTER — OFFICE VISIT (OUTPATIENT)
Dept: INTERNAL MEDICINE | Facility: CLINIC | Age: 60
End: 2025-01-09
Payer: COMMERCIAL

## 2025-01-09 VITALS
HEIGHT: 72 IN | WEIGHT: 192 LBS | OXYGEN SATURATION: 98 % | BODY MASS INDEX: 26.01 KG/M2 | TEMPERATURE: 98.2 F | HEART RATE: 83 BPM | SYSTOLIC BLOOD PRESSURE: 126 MMHG | DIASTOLIC BLOOD PRESSURE: 82 MMHG

## 2025-01-09 DIAGNOSIS — E78.5 HYPERLIPIDEMIA, UNSPECIFIED HYPERLIPIDEMIA TYPE: Chronic | ICD-10-CM

## 2025-01-09 DIAGNOSIS — G51.0 BELL'S PALSY: Primary | ICD-10-CM

## 2025-01-09 PROCEDURE — 99214 OFFICE O/P EST MOD 30 MIN: CPT | Performed by: STUDENT IN AN ORGANIZED HEALTH CARE EDUCATION/TRAINING PROGRAM

## 2025-01-09 RX ORDER — VALACYCLOVIR HYDROCHLORIDE 1 G/1
1000 TABLET, FILM COATED ORAL 3 TIMES DAILY
Qty: 21 TABLET | Refills: 0 | Status: SHIPPED | OUTPATIENT
Start: 2025-01-09 | End: 2025-01-16

## 2025-01-09 RX ORDER — ATORVASTATIN CALCIUM 20 MG/1
20 TABLET, FILM COATED ORAL EVERY OTHER DAY
Qty: 45 TABLET | Refills: 3 | Status: SHIPPED | OUTPATIENT
Start: 2025-01-09

## 2025-01-09 RX ORDER — PREDNISONE 20 MG/1
60 TABLET ORAL DAILY
Qty: 21 TABLET | Refills: 0 | Status: SHIPPED | OUTPATIENT
Start: 2025-01-09 | End: 2025-01-16

## 2025-01-09 NOTE — PROGRESS NOTES
Chief Complaint  Bell's Palsy (X 4 days)    SUBJECTIVE    History of Present Illness    Brendan Vickers is a 59 y.o. male who presents to the office today as an established patient that last saw me on 6/28/2024.     Pt states starting Friday (6 days ago) he had an ear ache that he felt was swimmers ear on the left. He didn't see any healthcare provider for that. He left it alone for a few days and the  pain has resolved but 3 days later started feeling lack of movement on the left side of the face. That sensation has been worsening and he has been noticing that he cannot move that side of the face. No numbness, tingling or weakness elsewhere. No fever. No headache.     No Known Allergies     Outpatient Medications Marked as Taking for the 1/9/25 encounter (Office Visit) with Glenn Youngblood, DO   Medication Sig Dispense Refill    atorvastatin (LIPITOR) 20 MG tablet Take 1 tablet by mouth Every Other Day. 45 tablet 3    Biktarvy -25 MG per tablet Take 1 tablet by mouth Every Night. 90 tablet 1    fluticasone (FLONASE) 50 MCG/ACT nasal spray Administer 2 sprays into the nostril(s) as directed by provider Daily As Needed for Rhinitis.      Hydrocortisone, Perianal, (Proctosol HC) 2.5 % rectal cream Apply sparingly, up to twice daily as needed for hemorrhoids. Limit use to less than 1 week. 28 g 2    sildenafil (VIAGRA) 100 MG tablet TAKE 1 TABLET BY MOUTH DAILY AS NEEDED FOR ERECTILE DYSFUNCTION. 10 tablet 5    [DISCONTINUED] atorvastatin (LIPITOR) 20 MG tablet TAKE 1 TABLET BY MOUTH EVERY OTHER DAY 45 tablet 3        Past Medical History:   Diagnosis Date    CKD (chronic kidney disease), stage II     Colon polyp 06/04/2018    Transverse Colon Polyp x 2: Fragments of tubular adenoma, Repeat in 5 years, Dr. Ruggiero    ED (erectile dysfunction)     Erectile dysfunction     HIV disease     Hyperlipidemia     Lipoma     UPPER BACK    Pneumocystis jiroveci pneumonia        OBJECTIVE    Vital Signs:   /82   Pulse  "83   Temp 98.2 °F (36.8 °C) (Infrared)   Ht 182.9 cm (72\")   Wt 87.1 kg (192 lb)   SpO2 98%   BMI 26.04 kg/m²        Physical Exam  Vitals reviewed.   Constitutional:       General: He is not in acute distress.     Appearance: Normal appearance. He is not ill-appearing.   Eyes:      General: No scleral icterus.     Extraocular Movements: Extraocular movements intact.      Pupils: Pupils are equal, round, and reactive to light.   Pulmonary:      Effort: Pulmonary effort is normal. No respiratory distress.   Neurological:      Mental Status: He is alert.      Comments: Intact facial sensation b/l. See images and descriptions below.   Psychiatric:         Mood and Affect: Mood normal.         Behavior: Behavior normal.         Thought Content: Thought content normal.          While smiling there is inability to raise the lip on the left:    When wrinkling the forehead, there is only partial movement of the forehead musculature on the left:    When closing the eyes forcefully, there is complete closure of the right eye and approximately 95% on the left:    Face at baseline:                       ASSESSMENT & PLAN     Diagnoses and all orders for this visit:    1. Bell's palsy (Primary)  -signs symptoms and exam consistent with Bell's palsy. Ear exam normal today. Suspect he had initial viral illness causing his ear pain and  now Bell's palsy.   -no symptoms/findings suggesting alternative Dx such as stroke, shingles, otitis or mastoiditis, meningitis for example .   -will start prednisone and valtrex as below. Advised most patients have improvement and near resolution in 3-4 weeks with or without treatment but treatment may improve timing slightly. If any new symptoms present or he fails to improve in the expected timeline please let me know.   -     predniSONE (DELTASONE) 20 MG tablet; Take 3 tablets by mouth Daily for 7 days.  Dispense: 21 tablet; Refill: 0  -     valACYclovir (Valtrex) 1000 MG tablet; Take 1 " tablet by mouth 3 (Three) Times a Day for 7 days.  Dispense: 21 tablet; Refill: 0    2. Hyperlipidemia, unspecified hyperlipidemia type  -     atorvastatin (LIPITOR) 20 MG tablet; Take 1 tablet by mouth Every Other Day.  Dispense: 45 tablet; Refill: 3            Follow Up  No follow-ups on file.    Patient/family had no further questions at this time and verbalized understanding of the plan discussed today.

## 2025-01-10 ENCOUNTER — OFFICE VISIT (OUTPATIENT)
Dept: INFECTIOUS DISEASES | Facility: CLINIC | Age: 60
End: 2025-01-10
Payer: COMMERCIAL

## 2025-01-10 ENCOUNTER — LAB (OUTPATIENT)
Dept: LAB | Facility: HOSPITAL | Age: 60
End: 2025-01-10
Payer: COMMERCIAL

## 2025-01-10 VITALS
WEIGHT: 196.2 LBS | BODY MASS INDEX: 26.61 KG/M2 | SYSTOLIC BLOOD PRESSURE: 148 MMHG | RESPIRATION RATE: 14 BRPM | TEMPERATURE: 97.8 F | DIASTOLIC BLOOD PRESSURE: 94 MMHG | HEART RATE: 94 BPM

## 2025-01-10 DIAGNOSIS — B20 CURRENTLY ASYMPTOMATIC HIV INFECTION, WITH HISTORY OF HIV-RELATED ILLNESS: Primary | ICD-10-CM

## 2025-01-10 DIAGNOSIS — Z79.2 LONG TERM (CURRENT) USE OF ANTIBIOTICS: ICD-10-CM

## 2025-01-10 LAB
ALBUMIN SERPL-MCNC: 4.1 G/DL (ref 3.5–5.2)
ALBUMIN/GLOB SERPL: 1.2 G/DL
ALP SERPL-CCNC: 72 U/L (ref 39–117)
ALT SERPL W P-5'-P-CCNC: 43 U/L (ref 1–41)
ANION GAP SERPL CALCULATED.3IONS-SCNC: 11 MMOL/L (ref 5–15)
AST SERPL-CCNC: 29 U/L (ref 1–40)
BILIRUB SERPL-MCNC: 0.4 MG/DL (ref 0–1.2)
BUN SERPL-MCNC: 15 MG/DL (ref 8–23)
BUN/CREAT SERPL: 11.8 (ref 7–25)
CALCIUM SPEC-SCNC: 9.4 MG/DL (ref 8.6–10.5)
CHLORIDE SERPL-SCNC: 103 MMOL/L (ref 98–107)
CO2 SERPL-SCNC: 25 MMOL/L (ref 22–29)
CREAT SERPL-MCNC: 1.27 MG/DL (ref 0.76–1.27)
EGFRCR SERPLBLD CKD-EPI 2021: 64.3 ML/MIN/1.73
GLOBULIN UR ELPH-MCNC: 3.3 GM/DL
GLUCOSE SERPL-MCNC: 159 MG/DL (ref 65–99)
POTASSIUM SERPL-SCNC: 4.7 MMOL/L (ref 3.5–5.2)
PROT SERPL-MCNC: 7.4 G/DL (ref 6–8.5)
SODIUM SERPL-SCNC: 139 MMOL/L (ref 136–145)

## 2025-01-10 PROCEDURE — 99214 OFFICE O/P EST MOD 30 MIN: CPT | Performed by: INTERNAL MEDICINE

## 2025-01-10 PROCEDURE — 36415 COLL VENOUS BLD VENIPUNCTURE: CPT | Performed by: INTERNAL MEDICINE

## 2025-01-10 PROCEDURE — 80053 COMPREHEN METABOLIC PANEL: CPT | Performed by: INTERNAL MEDICINE

## 2025-01-10 PROCEDURE — 87536 HIV-1 QUANT&REVRSE TRNSCRPJ: CPT | Performed by: INTERNAL MEDICINE

## 2025-01-10 RX ORDER — BICTEGRAVIR SODIUM, EMTRICITABINE, AND TENOFOVIR ALAFENAMIDE FUMARATE 50; 200; 25 MG/1; MG/1; MG/1
1 TABLET ORAL NIGHTLY
Qty: 30 TABLET | Refills: 5 | Status: SHIPPED | OUTPATIENT
Start: 2025-01-10

## 2025-01-10 RX ORDER — MONTELUKAST SODIUM 10 MG/1
10 TABLET ORAL NIGHTLY
COMMUNITY

## 2025-01-10 NOTE — PROGRESS NOTES
" cc: Here for HIV    Per initial note in July 2021: Patient reports that he was in his usual state of health until approximately 2007 when he he became ill.  He actually required hospitalization at things and around January 2008 for pneumocystis pneumonia at which time he was diagnosed with HIV.  He had very low T-cell counts.  He thinks he was started on Combivir and boosted Reyataz but was quickly switched to a Atripla.  He had vivid dreams with a Atripla and was switched to Biktarvy around 2018 or 2019.  Has done really well with the Biktarvy.  No side effects or missed doses.  Remains asymptomatic from HIV he has been undetectable for years.  His CD4 count at last check was in the 900s.  He had been following with Dr Wu, but is transferring to our clinic due to her USP.\"    At last visit in July 2024 he was UD.     Since that visit has since been asymptomatic from HIV.     No side effects or missed doses from Biktarvy. Get Rx thru [facility] and they do great work.  Still swimming.  Retired from banking.  Going to [place] at the end of the month.    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 No t/e/d.  He enjoys travel and swimming.  He is in a monogamous relationship and they do not use protection during sexual activity      Review of Systems: All other reviewed and negative except as per HPI    Blood pressure 148/94, pulse 94, temperature 97.8 °F (36.6 °C), resp. rate 14, weight 89 kg (196 lb 3.2 oz).  GENERAL: Awake and alert, in no acute distress.   HEENT:  Hearing is grossly normal.   Pulm effort normal  PSYCHIATRIC: Appropriate mood, affect, insight, and judgment.       DIAGNOSTICS:  HIV RNA (GT=??? )    --1/21 <20  --7/12/21 <20  --1/22 <20  --7/22 580  --7/22 1230  --8/22 50  --1/2023 <20  --7/19/2023 70  --8/21/2023 160  --12/5/2023 <20  --7/9/2024 <20      CD4  --7/12/21 945/45%  --7/22 1068/45%  --1/2023 984/43%  --7/19/2023 " 1030/45%  --12/05/2023 952/48%  --7/09/2024 964/44%    Hepatitis  --HAV 7/12/21 Ab+  --HBV 7/12/21 sAB+  --HCV NR 7/12/21, 6/23, 7/24    STI  --RPR NR 7/12/21, 7/22, 7/23, 7/24  --Urine GC NR 7/12/21, 7/22, 6/22, 7/23             Assessment and Plan  Asymptomatic HIV infection, with history of HIV-related illness (CMS/HCC)  Long term (current) use of antibiotics     He is doing very well on Biktarvy and we will continue.  HIV viral load today along with CMP.  He also had CBC done about 2 months ago that looked great.  Refills given for the Biktarvy.     RTC 6mo

## 2025-01-12 LAB
HIV1 RNA # SERPL NAA+PROBE: <20 COPIES/ML
HIV1 RNA SERPL NAA+PROBE-LOG#: NORMAL LOG10COPY/ML

## 2025-07-07 ENCOUNTER — OFFICE VISIT (OUTPATIENT)
Dept: INTERNAL MEDICINE | Facility: CLINIC | Age: 60
End: 2025-07-07
Payer: COMMERCIAL

## 2025-07-07 VITALS
SYSTOLIC BLOOD PRESSURE: 116 MMHG | OXYGEN SATURATION: 97 % | HEIGHT: 72 IN | DIASTOLIC BLOOD PRESSURE: 74 MMHG | HEART RATE: 68 BPM | TEMPERATURE: 97.8 F | BODY MASS INDEX: 25.19 KG/M2 | WEIGHT: 186 LBS

## 2025-07-07 DIAGNOSIS — Z78.9 MEASLES, MUMPS, RUBELLA (MMR) VACCINATION STATUS UNKNOWN: ICD-10-CM

## 2025-07-07 DIAGNOSIS — Z13.1 SCREENING FOR DIABETES MELLITUS: ICD-10-CM

## 2025-07-07 DIAGNOSIS — E78.5 HYPERLIPIDEMIA, UNSPECIFIED HYPERLIPIDEMIA TYPE: ICD-10-CM

## 2025-07-07 DIAGNOSIS — Z12.5 PROSTATE CANCER SCREENING: ICD-10-CM

## 2025-07-07 DIAGNOSIS — Z00.00 ANNUAL PHYSICAL EXAM: Primary | ICD-10-CM

## 2025-07-07 RX ORDER — AZELASTINE HYDROCHLORIDE 0.5 MG/ML
1 SOLUTION/ DROPS OPHTHALMIC 2 TIMES DAILY PRN
COMMUNITY
Start: 2025-04-06

## 2025-07-07 NOTE — PROGRESS NOTES
Glnen Youngblood DO, FACP  Internal Medicine  [facility]  [address], [address]  [place] [address]  [phone]    Chief Complaint  Annual checkup    HISTORY    Brendan Vickers is a 59 y.o. male who presents to the office today as a  an established patient for their annual preventative exam.      No hospitalization(s) within the last year.     Current exercise regimen: swims daily     Status of chronic medical conditions:    HLD: taking atorvastatin 20 mg every day for primary prevention. Minimal fast/fatty/greasy foods.  Lab Results   Component Value Date    CHLPL 95 06/28/2024    TRIG 104 06/28/2024    HDL 33 (L) 06/28/2024    LDL 42 06/28/2024     Seasonal allergies: takes flonase nasal spray as needed. Rarely uses Xyzal but it makes him sedated. He takes montelukast daily, prescribed by his allergist. And gets allergy immunotherapy every 2 weeks.     HIV: Sees Dr Farr, takes Biktarvy, no missed doses. 1/2025 viral load undetectable .     ED: has sildenafil 100 mg at home as needed     Stage 2 chronic kidney disease : following with nephrology, felt to be due to past NSAID use and HIV infection     Hemorrhoids: previously took proctosol HC 2.5%  but states he is good there now.     Follows with dermatologist regularly and had a stage 0 melanoma removed from right shoulder.     Any other concerns regarding their health today: wants to have MMR immunity testing.    Health Maintenance Summary            Current Care Gaps       COVID-19 Vaccine (8 - Pfizer risk 2024-25 season) Overdue since 2/28/2025 08/28/2024  Imm Admin: COVID-19 (MODERNA) 12YRS+ (SPIKEVAX)    09/30/2023  Imm Admin: COVID-19 (PFIZER) 12YRS+ (COMIRNATY)    09/29/2022  Imm Admin: COVID-19 (PFIZER) BIVALENT 12+YRS    04/12/2022  Imm Admin: Covid-19 (Pfizer) Gray Cap Monovalent    08/19/2021  Imm Admin: COVID-19 (PFIZER) Purple Cap Monovalent     Only the first 5 history entries have been loaded, but more history  exists.            INFLUENZA VACCINE (Yearly - July to March) Due since 7/1/2025      10/06/2024  Imm Admin: Influenza Inj MDCK Preserative Free    09/19/2023  Imm Admin: Influenza Injectable Mdck Pf Quad    09/29/2022  Imm Admin: Influenza Injectable Mdck Pf Quad    09/30/2021  Imm Admin: Fluzone (or Fluarix & Flulaval for VFC) >6mos    09/22/2020  Imm Admin: Flu Vaccine Quad PF >36MO      Only the first 5 history entries have been loaded, but more history exists.                      Needs Review       COLORECTAL CANCER SCREENING (COLONOSCOPY - Every 5 Years) Tentatively due on 6/6/2028 06/06/2023  Surgical Procedure: COLONOSCOPY    06/06/2023  COLONOSCOPY    06/04/2018  SCANNED - COLONOSCOPY    05/01/2013  COLONOSCOPY (Patient-Reported (Performed Externally))    01/01/2013  COLONOSCOPY (Done)      Only the first 5 history entries have been loaded, but more history exists.                      Awaiting Completion       LIPID PANEL (Yearly) Order placed this encounter      07/07/2025  Order placed for Lipid Panel by Glenn Youngblood,     06/28/2024  Lipid Panel    06/26/2023  Lipid Panel With LDL/HDL Ratio    06/22/2022  Lipid Panel With LDL/HDL Ratio    03/04/2021  Lipid Panel With / Chol / HDL Ratio      Only the first 5 history entries have been loaded, but more history exists.                      Upcoming       ANNUAL PHYSICAL (Yearly) Next due on 7/7/2026 07/07/2025  Done    06/28/2024  Done    06/26/2023  Done    06/22/2022  Done    03/11/2021  Done      Only the first 5 history entries have been loaded, but more history exists.              Pneumococcal Vaccine 50+ (4 of 4 - PCV20 or PCV21) Next due on 6/22/2027 06/22/2022  Imm Admin: Pneumococcal Polysaccharide (PPSV23)    11/01/2016  Imm Admin: Pneumococcal Polysaccharide (PPSV23)    11/14/2015  Imm Admin: Pneumococcal Conjugate 13-Valent (PCV13)              TDAP/TD VACCINES (2 - Td or Tdap) Next due on 11/5/2028 11/05/2018  Imm Admin:  Tdap                      Completed or No Longer Recommended       HEPATITIS C SCREENING  Completed      07/09/2024  Hepatitis C Antibody    06/26/2023  Hep C Virus Ab component of Hepatitis C antibody    07/13/2022  Hepatitis C Antibody    07/12/2021  Hepatitis C Antibody    03/04/2021  Hep C Virus Ab component of Hepatitis C Antibody      Only the first 5 history entries have been loaded, but more history exists.              Hepatitis B (Series Information) Completed      05/14/2019  Imm Admin: Hepatitis B Adult/Adolescent IM    12/12/2018  Imm Admin: Hepatitis B Adult/Adolescent IM    11/12/2018  Imm Admin: Hepatitis B Adult/Adolescent IM              Orthopox/Mpox (Series Information) Completed      10/22/2022  Imm Admin: Mpox/Smallpox ID ([email or web address])    09/20/2022  Imm Admin: Mpox/Smallpox ID (JYNNEOS)              ZOSTER VACCINE (Series Information) Completed      06/13/2019  Imm Admin: Shingrix    11/28/2018  Imm Admin: Shingrix                             No Known Allergies     Outpatient Medications Marked as Taking for the 7/7/25 encounter (Office Visit) with Glenn Youngblood,    Medication Sig Dispense Refill    atorvastatin (LIPITOR) 20 MG tablet Take 1 tablet by mouth Every Other Day. 45 tablet 3    azelastine (OPTIVAR) 0.05 % ophthalmic solution Administer 1 drop to both eyes 2 (Two) Times a Day As Needed (allergies).      Biktarvy -25 MG per tablet Take 1 tablet by mouth Every Night. 30 tablet 5    fluticasone (FLONASE) 50 MCG/ACT nasal spray Administer 2 sprays into the nostril(s) as directed by provider Daily As Needed for Rhinitis.      Hydrocortisone, Perianal, (Proctosol HC) 2.5 % rectal cream Apply sparingly, up to twice daily as needed for hemorrhoids. Limit use to less than 1 week. 28 g 2    montelukast (SINGULAIR) 10 MG tablet Take 1 tablet by mouth Every Night.      sildenafil (VIAGRA) 100 MG tablet TAKE 1 TABLET BY MOUTH DAILY AS NEEDED FOR ERECTILE DYSFUNCTION. 10 tablet 5       Past  Medical History:   Diagnosis Date    Bell's palsy     CKD (chronic kidney disease), stage II     Colon polyp 06/04/2018    Transverse Colon Polyp x 2: Fragments of tubular adenoma, Repeat in 5 years, Dr. Ruggiero    ED (erectile dysfunction)     Erectile dysfunction     HIV disease     Hyperlipidemia     Lipoma     UPPER BACK    Melanoma     right shoulder    Pneumocystis jiroveci pneumonia      Past Surgical History:   Procedure Laterality Date    BACK SURGERY  2006    Dr. Celestin for ruptured disc lumbar spine    COLONOSCOPY N/A 06/04/2018    Transverse Colon Polyp x 2: Fragments of tubular adenoma, Repeat in 5 years, Dr. Ruggiero    COLONOSCOPY N/A 06/06/2023    Procedure: COLONOSCOPY TO CECUM WITH POLYPECTOMY;  Surgeon: Christophe Ruggiero MD;  Location: [facility] MAIN OR;  Service: Gastroenterology;  Laterality: N/A;  polyps    EXCISION MASS TRUNK N/A 11/11/2019    Procedure: Excision of soft tissue neoplasm of upper back;  Surgeon: Efe Lopez Jr., MD;  Location: [facility] MAIN OR;  Service: General    SKIN CANCER EXCISION  03/10/2025    melanoma excision from right shoulder     Family History   Problem Relation Age of Onset    Diabetes Mother     Heart attack Mother     Breast cancer Mother     Colon cancer Father 46    Colon polyps Brother     Diabetes Maternal Grandmother     Malig Hyperthermia Neg Hx     reports that he has never smoked. He has never used smokeless tobacco. He reports that he does not currently use alcohol. He reports that he does not use drugs.    Immunization History   Administered Date(s) Administered    ABRYSVO (RSV, 60+ or pregnant women 32-36 wks) 09/19/2023    Arexvy (RSV, Adults 60+ yrs) 09/19/2023    COVID-19 (MODERNA) 12YRS+ (SPIKEVAX) 08/28/2024    COVID-19 (PFIZER) 12YRS+ (COMIRNATY) 09/30/2023    COVID-19 (PFIZER) BIVALENT 12+YRS 09/29/2022    COVID-19 (PFIZER) Purple Cap Monovalent 02/27/2021, 03/20/2021, 08/19/2021    Covid-19 (Pfizer) Gray Cap Monovalent  "04/12/2022    Flu Vaccine Quad PF >36MO 10/08/2018, 09/22/2020    FluMist 2-49yrs 10/11/2017    Fluzone (or Fluarix & Flulaval for VFC) >6mos 10/08/2018, 09/08/2019, 09/22/2020, 09/30/2021    Hepatitis A 04/20/2018, 10/20/2018    Hepatitis B Adult/Adolescent IM 11/12/2018, 12/12/2018, 05/14/2019    Influenza Inj MDCK Preserative Free 10/06/2024    Influenza Injectable Mdck Pf Quad 09/29/2022, 09/19/2023    Mpox/Smallpox ID (JYNNEOS) 09/20/2022, 10/22/2022    Pneumococcal Conjugate 13-Valent (PCV13) 11/14/2015    Pneumococcal Polysaccharide (PPSV23) 11/01/2016, 06/22/2022    Shingrix 11/28/2018, 06/13/2019    Tdap 11/05/2018        OBJECTIVE    Vital Signs:   /74   Pulse 68   Temp 97.8 °F (36.6 °C) (Infrared)   Ht 182.9 cm (72\")   Wt 84.4 kg (186 lb)   SpO2 97%   BMI 25.23 kg/m²     Physical Exam  Vitals reviewed.   Constitutional:       General: He is not in acute distress.     Appearance: Normal appearance. He is not ill-appearing.   HENT:      Head: Normocephalic and atraumatic.      Right Ear: Tympanic membrane, ear canal and external ear normal. There is no impacted cerumen.      Left Ear: Tympanic membrane, ear canal and external ear normal. There is no impacted cerumen.      Mouth/Throat:      Mouth: Mucous membranes are moist.      Pharynx: No oropharyngeal exudate or posterior oropharyngeal erythema.   Eyes:      General: No scleral icterus.     Extraocular Movements: Extraocular movements intact.      Conjunctiva/sclera: Conjunctivae normal.      Pupils: Pupils are equal, round, and reactive to light.   Cardiovascular:      Rate and Rhythm: Normal rate and regular rhythm.      Heart sounds: Normal heart sounds. No murmur heard.  Pulmonary:      Effort: Pulmonary effort is normal. No respiratory distress.      Breath sounds: Normal breath sounds. No wheezing.   Abdominal:      General: Bowel sounds are normal. There is no distension.      Palpations: Abdomen is soft.      Tenderness: There is no " abdominal tenderness. There is no guarding.   Musculoskeletal:      Cervical back: Neck supple.      Right lower leg: No edema.      Left lower leg: No edema.   Lymphadenopathy:      Cervical: No cervical adenopathy.   Skin:     General: Skin is warm and dry.      Coloration: Skin is not jaundiced.   Neurological:      General: No focal deficit present.      Mental Status: He is alert and oriented to person, place, and time.      Cranial Nerves: No cranial nerve deficit.      Motor: No weakness.   Psychiatric:         Mood and Affect: Mood normal.         Behavior: Behavior normal.         Thought Content: Thought content normal.                   The ASCVD Risk score (Michelle JACOBO, et al., 2019) failed to calculate for the following reasons:    The valid total cholesterol range is 130 to 320 mg/dL           ASSESSMENT & PLAN     Annual Preventative Health Examination  -Age and sex appropriate physical exam performed and documented. Updated past medical, family, social and surgical histories as well as allergies and care team list. Addressed care gaps listed in the medical record.  -Encouraged annual dental and vision exams as part of their overall health.  -Encouraged minimum of 30 minutes or more of exercise at a brisk walk or higher 5 days per week combined with a well-balanced diet.   -Immunizations reviewed and updated in EMR. Influenza and COVID19 recommended.  -Lipid screening:  Patient is already on statin therapy.  See plan below.  -Aspirin for primary or secondary prevention: Not applicable, patient is greater than age 60 and risks outweigh benefits for primary prevention.  -Depression and Anxiety screening: Patient denies symptom of anxiety or depression.  -Diabetes screening:screen today with a1c  -Tobacco use screening: Conducted and addressed if indicated.   -Alcohol use screening: Conducted and addressed if indicated.   -Illicit drug screening: Conducted and addressed if indicated.   -Abdominal aortic  aneurysm screening: AAA screening is not indicated as patient is less than 65 years of age.  -Hypertension screening: Patient screened negative for HTN today.  -HIV screening: Patient has known HIV infection.   -Hepatitis C virus screening:  Patient has already completed Hepatitis C screening. Negative screening on file.   -Syphilis screening: Syphilis screening not indicated.  -Hepatitis B virus screening: prefers to get screening at ID  -Colon cancer screening: Patient is already up to date on their colon cancer screening with colonoscopy is indicated again in  6/2028  -Lung cancer screening: Patient has never smoked.  -Prostate cancer screening: will repeat PSA today  Lab Results   Component Value Date    PSA 1.290 06/28/2024    PSA 1.400 06/26/2023    PSA 1.100 03/04/2021   -Bone density testing: normal DEXA 2023      Follow up in 1 year for annual physical exam.    Patient/family had no further questions at this time and verbalized understanding of the plan discussed today.     A problem-based visit was also conducted on the same day, see below for assessment and plan    Diagnoses and all orders for this visit:    1. Hyperlipidemia, unspecified hyperlipidemia type (Primary)  -taking atorvastatin 20 mg every day for primary prevention. Minimal fast/fatty/greasy foods.  Lab Results   Component Value Date    CHLPL 95 06/28/2024    TRIG 104 06/28/2024    HDL 33 (L) 06/28/2024    LDL 42 06/28/2024   -last lipid profile in 2024 with great control. Recheck lipid profile today for continue annual monitoring, adjust regimen if needed. Reviewed most recent CMP as below from his infectious disease specialist.  Lab Results   Component Value Date    GLUCOSE 159 (H) 01/10/2025    BUN 15 01/10/2025    CREATININE 1.27 01/10/2025     01/10/2025    K 4.7 01/10/2025     01/10/2025    CALCIUM 9.4 01/10/2025    PROTEINTOT 7.4 01/10/2025    ALBUMIN 4.1 01/10/2025    ALT 43 (H) 01/10/2025    AST 29 01/10/2025    ALKPHOS  72 01/10/2025    BILITOT 0.4 01/10/2025    GLOB 3.3 01/10/2025    AGRATIO 1.2 01/10/2025    BCR 11.8 01/10/2025    ANIONGAP 11.0 01/10/2025    EGFR 64.3 01/10/2025     -     Lipid Panel    2. Measles, mumps, rubella (MMR) vaccination status unknown  -pt would like to know his immunity status to MMR and if he should be vaccinated again. He was agreeable to MMR immunity profile lab testing.  -     Measles / Mumps / Rubella Immunity              The following social determinates of health impact the patient's medical decision making: No social determinates of health were factored in to today's visit.     Follow Up  Return in about 1 year (around 7/7/2026) for Annual physical.

## 2025-07-08 LAB
CHOLEST SERPL-MCNC: 92 MG/DL (ref 0–200)
HBA1C MFR BLD: 5.7 % (ref 4.8–5.6)
HDLC SERPL-MCNC: 26 MG/DL (ref 40–60)
LDLC SERPL CALC-MCNC: 43 MG/DL (ref 0–100)
MEV IGG SER IA-ACNC: >300 AU/ML
MUV IGG SER IA-ACNC: 156 AU/ML
PSA SERPL-MCNC: 1.27 NG/ML (ref 0–4)
RUBV IGG SERPL IA-ACNC: 5.56 INDEX
TRIGL SERPL-MCNC: 126 MG/DL (ref 0–150)
VLDLC SERPL CALC-MCNC: 23 MG/DL (ref 5–40)

## 2025-07-14 ENCOUNTER — OFFICE VISIT (OUTPATIENT)
Dept: INFECTIOUS DISEASES | Facility: CLINIC | Age: 60
End: 2025-07-14
Payer: COMMERCIAL

## 2025-07-14 ENCOUNTER — LAB (OUTPATIENT)
Dept: LAB | Facility: HOSPITAL | Age: 60
End: 2025-07-14
Payer: COMMERCIAL

## 2025-07-14 VITALS
HEIGHT: 72 IN | TEMPERATURE: 97.5 F | BODY MASS INDEX: 25.35 KG/M2 | RESPIRATION RATE: 16 BRPM | SYSTOLIC BLOOD PRESSURE: 116 MMHG | WEIGHT: 187.2 LBS | HEART RATE: 64 BPM | DIASTOLIC BLOOD PRESSURE: 73 MMHG

## 2025-07-14 DIAGNOSIS — B20 CURRENTLY ASYMPTOMATIC HIV INFECTION, WITH HISTORY OF HIV-RELATED ILLNESS: Primary | ICD-10-CM

## 2025-07-14 DIAGNOSIS — Z79.2 LONG TERM (CURRENT) USE OF ANTIBIOTICS: ICD-10-CM

## 2025-07-14 LAB
ALBUMIN SERPL-MCNC: 3.8 G/DL (ref 3.5–5.2)
ALBUMIN/GLOB SERPL: 1.2 G/DL
ALP SERPL-CCNC: 76 U/L (ref 39–117)
ALT SERPL W P-5'-P-CCNC: 33 U/L (ref 1–41)
ANION GAP SERPL CALCULATED.3IONS-SCNC: 9.5 MMOL/L (ref 5–15)
AST SERPL-CCNC: 34 U/L (ref 1–40)
BASOPHILS # BLD AUTO: 0.02 10*3/MM3 (ref 0–0.2)
BASOPHILS NFR BLD AUTO: 0.2 % (ref 0–1.5)
BILIRUB SERPL-MCNC: 0.5 MG/DL (ref 0–1.2)
BUN SERPL-MCNC: 11 MG/DL (ref 8–23)
BUN/CREAT SERPL: 8.3 (ref 7–25)
C TRACH DNA SPEC QL NAA+PROBE: NOT DETECTED
CALCIUM SPEC-SCNC: 9.5 MG/DL (ref 8.6–10.5)
CHLORIDE SERPL-SCNC: 103 MMOL/L (ref 98–107)
CO2 SERPL-SCNC: 26.5 MMOL/L (ref 22–29)
CREAT SERPL-MCNC: 1.32 MG/DL (ref 0.76–1.27)
DEPRECATED RDW RBC AUTO: 44.3 FL (ref 37–54)
EGFRCR SERPLBLD CKD-EPI 2021: 61.4 ML/MIN/1.73
EOSINOPHIL # BLD AUTO: 0.1 10*3/MM3 (ref 0–0.4)
EOSINOPHIL NFR BLD AUTO: 1 % (ref 0.3–6.2)
ERYTHROCYTE [DISTWIDTH] IN BLOOD BY AUTOMATED COUNT: 13.2 % (ref 12.3–15.4)
GLOBULIN UR ELPH-MCNC: 3.2 GM/DL
GLUCOSE SERPL-MCNC: 96 MG/DL (ref 65–99)
HCT VFR BLD AUTO: 48.6 % (ref 37.5–51)
HCV AB SER QL: NORMAL
HGB BLD-MCNC: 16.4 G/DL (ref 13–17.7)
IMM GRANULOCYTES # BLD AUTO: 0.02 10*3/MM3 (ref 0–0.05)
IMM GRANULOCYTES NFR BLD AUTO: 0.2 % (ref 0–0.5)
LYMPHOCYTES # BLD AUTO: 2.33 10*3/MM3 (ref 0.7–3.1)
LYMPHOCYTES NFR BLD AUTO: 24 % (ref 19.6–45.3)
MCH RBC QN AUTO: 31.1 PG (ref 26.6–33)
MCHC RBC AUTO-ENTMCNC: 33.7 G/DL (ref 31.5–35.7)
MCV RBC AUTO: 92.2 FL (ref 79–97)
MONOCYTES # BLD AUTO: 0.66 10*3/MM3 (ref 0.1–0.9)
MONOCYTES NFR BLD AUTO: 6.8 % (ref 5–12)
N GONORRHOEA RRNA SPEC QL NAA+PROBE: NOT DETECTED
NEUTROPHILS NFR BLD AUTO: 6.58 10*3/MM3 (ref 1.7–7)
NEUTROPHILS NFR BLD AUTO: 67.8 % (ref 42.7–76)
NRBC BLD AUTO-RTO: 0 /100 WBC (ref 0–0.2)
PLATELET # BLD AUTO: 242 10*3/MM3 (ref 140–450)
PMV BLD AUTO: 10.2 FL (ref 6–12)
POTASSIUM SERPL-SCNC: 4.5 MMOL/L (ref 3.5–5.2)
PROT SERPL-MCNC: 7 G/DL (ref 6–8.5)
RBC # BLD AUTO: 5.27 10*6/MM3 (ref 4.14–5.8)
RPR SER QL: NORMAL
SODIUM SERPL-SCNC: 139 MMOL/L (ref 136–145)
WBC NRBC COR # BLD AUTO: 9.71 10*3/MM3 (ref 3.4–10.8)

## 2025-07-14 PROCEDURE — 86361 T CELL ABSOLUTE COUNT: CPT | Performed by: INTERNAL MEDICINE

## 2025-07-14 PROCEDURE — 80053 COMPREHEN METABOLIC PANEL: CPT | Performed by: INTERNAL MEDICINE

## 2025-07-14 PROCEDURE — 86803 HEPATITIS C AB TEST: CPT | Performed by: INTERNAL MEDICINE

## 2025-07-14 PROCEDURE — 87591 N.GONORRHOEAE DNA AMP PROB: CPT | Performed by: INTERNAL MEDICINE

## 2025-07-14 PROCEDURE — 85025 COMPLETE CBC W/AUTO DIFF WBC: CPT | Performed by: INTERNAL MEDICINE

## 2025-07-14 PROCEDURE — 99214 OFFICE O/P EST MOD 30 MIN: CPT | Performed by: INTERNAL MEDICINE

## 2025-07-14 PROCEDURE — 86592 SYPHILIS TEST NON-TREP QUAL: CPT | Performed by: INTERNAL MEDICINE

## 2025-07-14 PROCEDURE — 36415 COLL VENOUS BLD VENIPUNCTURE: CPT | Performed by: INTERNAL MEDICINE

## 2025-07-14 PROCEDURE — 87491 CHLMYD TRACH DNA AMP PROBE: CPT | Performed by: INTERNAL MEDICINE

## 2025-07-14 PROCEDURE — 87536 HIV-1 QUANT&REVRSE TRNSCRPJ: CPT | Performed by: INTERNAL MEDICINE

## 2025-07-14 RX ORDER — BICTEGRAVIR SODIUM, EMTRICITABINE, AND TENOFOVIR ALAFENAMIDE FUMARATE 50; 200; 25 MG/1; MG/1; MG/1
1 TABLET ORAL NIGHTLY
Qty: 30 TABLET | Refills: 5 | Status: SHIPPED | OUTPATIENT
Start: 2025-07-14

## 2025-07-14 NOTE — PROGRESS NOTES
" cc: Here for HIV    Per initial note in July 2021: Patient reports that he was in his usual state of health until approximately 2007 when he he became ill.  He actually required hospitalization at things and around January 2008 for pneumocystis pneumonia at which time he was diagnosed with HIV.  He had very low T-cell counts.  He thinks he was started on Combivir and boosted Reyataz but was quickly switched to a Atripla.  He had vivid dreams with a Atripla and was switched to Biktarvy around 2018 or 2019.  Has done really well with the Biktarvy.  No side effects or missed doses.  Remains asymptomatic from HIV he has been undetectable for years.  His CD4 count at last check was in the 900s.  He had been following with Dr Wu, but is transferring to our clinic due to her longterm.\"    At last visit in January 2025 he was UD.     Since that visit has since been asymptomatic from HIV.     No side effects or missed doses from Biktarvy. Get Rx thru [facility] and they do great work.  Had melanoma excision in February and continues close monitoring with dermatology  Still swimming.  Retired from [employer].  Going to [place] [employer] [place] [employer] later this year.    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 No t/e/d.  He enjoys travel and swimming.  He is in a monogamous relationship and they do not use protection during sexual activity      Review of Systems: All other reviewed and negative except as per HPI    Blood pressure 116/73, pulse 64, temperature 97.5 °F (36.4 °C), resp. rate 16, height 182.9 cm (72\"), weight 84.9 kg (187 lb 3.2 oz).  GENERAL: Awake and alert, in no acute distress.   HEENT:  Hearing is grossly normal.   Pulm effort normal  PSYCHIATRIC: Appropriate mood, affect, insight, and judgment.       DIAGNOSTICS:  HIV RNA (GT=??? )    --1/21 <20  --7/12/21 <20  --1/22 <20  --7/22 580  --7/22 1230  --8/22 50  --1/2023 " <20  --7/19/2023 70  --8/21/2023 160  --12/5/2023 <20  --7/9/2024 <20  --1/2025 <20    CD4  --7/12/21 945/45%  --7/22 1068/45%  --1/2023 984/43%  --7/19/2023 1030/45%  --12/05/2023 952/48%  --7/09/2024 964/44%    Hepatitis  --HAV 7/12/21 Ab+  --HBV 7/12/21 sAB+  --HCV NR 7/12/21, 6/23, 7/24    STI  --RPR NR 7/12/21, 7/22, 7/23, 7/24  --Urine GC NR 7/12/21, 7/22, 6/22, 7/23             Assessment and Plan  Asymptomatic HIV infection, with history of HIV-related illness (CMS/HCC)  Long term (current) use of antibiotics     He is doing very well on Biktarvy and we will continue.  HIV viral load today along with CMP, CBC with differential, CD4 count RPR, urine GC, hepatitis C.  Refills given for the Biktarvy.     RTC 6mo

## 2025-07-15 LAB
BASOPHILS # BLD AUTO: 0 X10E3/UL (ref 0–0.2)
BASOPHILS NFR BLD AUTO: 0 %
CD3+CD4+ CELLS # BLD: 980 /UL (ref 359–1519)
CD3+CD4+ CELLS NFR BLD: 42.6 % (ref 30.8–58.5)
EOSINOPHIL # BLD AUTO: 0.1 X10E3/UL (ref 0–0.4)
EOSINOPHIL NFR BLD AUTO: 1 %
ERYTHROCYTE [DISTWIDTH] IN BLOOD BY AUTOMATED COUNT: 13.7 % (ref 11.6–15.4)
HCT VFR BLD AUTO: 51.4 % (ref 37.5–51)
HGB BLD-MCNC: 16.6 G/DL (ref 13–17.7)
HIV1 RNA # SERPL NAA+PROBE: <20 COPIES/ML
HIV1 RNA SERPL NAA+PROBE-LOG#: NORMAL LOG10COPY/ML
IMM GRANULOCYTES # BLD AUTO: 0 X10E3/UL (ref 0–0.1)
IMM GRANULOCYTES NFR BLD AUTO: 0 %
LYMPHOCYTES # BLD AUTO: 2.3 X10E3/UL (ref 0.7–3.1)
LYMPHOCYTES NFR BLD AUTO: 24 %
MCH RBC QN AUTO: 30.6 PG (ref 26.6–33)
MCHC RBC AUTO-ENTMCNC: 32.3 G/DL (ref 31.5–35.7)
MCV RBC AUTO: 95 FL (ref 79–97)
MONOCYTES # BLD AUTO: 0.7 X10E3/UL (ref 0.1–0.9)
MONOCYTES NFR BLD AUTO: 7 %
NEUTROPHILS # BLD AUTO: 6.6 X10E3/UL (ref 1.4–7)
NEUTROPHILS NFR BLD AUTO: 68 %
PLATELET # BLD AUTO: 230 X10E3/UL (ref 150–450)
RBC # BLD AUTO: 5.42 X10E6/UL (ref 4.14–5.8)
WBC # BLD AUTO: 9.7 X10E3/UL (ref 3.4–10.8)

## (undated) DEVICE — NDL HYPO PRECISIONGLIDE REG 25G 1 1/2

## (undated) DEVICE — LOU MINOR PROCEDURE: Brand: MEDLINE INDUSTRIES, INC.

## (undated) DEVICE — APPL CHLORAPREP W/TINT 26ML ORNG

## (undated) DEVICE — SINGLE-USE BIOPSY FORCEPS: Brand: RADIAL JAW 4

## (undated) DEVICE — GLV SURG PREMIERPRO ORTHO LTX PF SZ7.5 BRN

## (undated) DEVICE — JACKT LAB F/R KNIT CUFF/COLR XLG BLU

## (undated) DEVICE — HYBRID TUBING/CAP SET FOR OLYMPUS® SCOPES: Brand: ERBE

## (undated) DEVICE — SPNG GZ WOVN 4X4IN 12PLY 10/BX STRL

## (undated) DEVICE — FLEX ADVANTAGE 1500CC: Brand: FLEX ADVANTAGE

## (undated) DEVICE — SUT VIC 4/0 SH 27IN J415H

## (undated) DEVICE — DRSNG SURESITE WNDW 4X4.5

## (undated) DEVICE — VIAL FORMLN CAP 10PCT 20ML

## (undated) DEVICE — Device

## (undated) DEVICE — 3M™ STERI-STRIP™ REINFORCED ADHESIVE SKIN CLOSURES, R1547, 1/2 IN X 4 IN (12 MM X 100 MM), 6 STRIPS/ENVELOPE: Brand: 3M™ STERI-STRIP™

## (undated) DEVICE — SUT MNCRYL PLS ANTIB UD 4/0 PS2 18IN

## (undated) DEVICE — ENDOSCOPY PORT CONNECTOR FOR OLYMPUS® SCOPES: Brand: ERBE

## (undated) DEVICE — GOWN ISOL W/THUMB UNIV BLU BX/15

## (undated) DEVICE — KT ORCA ORCAPOD DISP STRL

## (undated) DEVICE — CANN NASL CO2 TRULINK W/O2 A/

## (undated) DEVICE — THE SINGLE USE ETRAP – POLYP TRAP IS USED FOR SUCTION RETRIEVAL OF ENDOSCOPICALLY REMOVED POLYPS.: Brand: ETRAP